# Patient Record
Sex: MALE | Race: WHITE | NOT HISPANIC OR LATINO | URBAN - METROPOLITAN AREA
[De-identification: names, ages, dates, MRNs, and addresses within clinical notes are randomized per-mention and may not be internally consistent; named-entity substitution may affect disease eponyms.]

---

## 2018-03-09 ENCOUNTER — OUTPATIENT (OUTPATIENT)
Dept: OUTPATIENT SERVICES | Facility: HOSPITAL | Age: 33
LOS: 1 days | End: 2018-03-09
Payer: MEDICAID

## 2018-03-09 ENCOUNTER — APPOINTMENT (OUTPATIENT)
Dept: SLEEP CENTER | Facility: CLINIC | Age: 33
End: 2018-03-09
Payer: MEDICAID

## 2018-03-09 PROCEDURE — 95810 POLYSOM 6/> YRS 4/> PARAM: CPT

## 2018-03-09 PROCEDURE — 95810 POLYSOM 6/> YRS 4/> PARAM: CPT | Mod: 26

## 2018-03-13 DIAGNOSIS — G47.33 OBSTRUCTIVE SLEEP APNEA (ADULT) (PEDIATRIC): ICD-10-CM

## 2018-05-31 ENCOUNTER — APPOINTMENT (OUTPATIENT)
Dept: FAMILY MEDICINE | Facility: CLINIC | Age: 33
End: 2018-05-31
Payer: MEDICAID

## 2018-05-31 VITALS
DIASTOLIC BLOOD PRESSURE: 80 MMHG | OXYGEN SATURATION: 91 % | WEIGHT: 187 LBS | TEMPERATURE: 100.1 F | SYSTOLIC BLOOD PRESSURE: 120 MMHG | BODY MASS INDEX: 33.13 KG/M2 | HEIGHT: 63 IN | HEART RATE: 125 BPM

## 2018-05-31 PROCEDURE — 99203 OFFICE O/P NEW LOW 30 MIN: CPT

## 2018-06-01 ENCOUNTER — TRANSCRIPTION ENCOUNTER (OUTPATIENT)
Age: 33
End: 2018-06-01

## 2018-06-12 ENCOUNTER — TRANSCRIPTION ENCOUNTER (OUTPATIENT)
Age: 33
End: 2018-06-12

## 2018-07-30 ENCOUNTER — APPOINTMENT (OUTPATIENT)
Dept: FAMILY MEDICINE | Facility: CLINIC | Age: 33
End: 2018-07-30
Payer: COMMERCIAL

## 2018-07-30 VITALS
OXYGEN SATURATION: 97 % | DIASTOLIC BLOOD PRESSURE: 80 MMHG | TEMPERATURE: 99.1 F | BODY MASS INDEX: 33.13 KG/M2 | WEIGHT: 187 LBS | HEART RATE: 108 BPM | HEIGHT: 63 IN | SYSTOLIC BLOOD PRESSURE: 122 MMHG

## 2018-07-30 PROCEDURE — 99213 OFFICE O/P EST LOW 20 MIN: CPT

## 2018-07-30 RX ORDER — DOXYCYCLINE HYCLATE 100 MG/1
100 TABLET ORAL
Qty: 20 | Refills: 0 | Status: DISCONTINUED | COMMUNITY
Start: 2018-03-29

## 2018-07-30 RX ORDER — PREDNISONE 20 MG/1
20 TABLET ORAL
Qty: 18 | Refills: 0 | Status: DISCONTINUED | COMMUNITY
Start: 2018-05-31 | End: 2018-07-30

## 2018-08-17 ENCOUNTER — APPOINTMENT (OUTPATIENT)
Dept: FAMILY MEDICINE | Facility: CLINIC | Age: 33
End: 2018-08-17
Payer: COMMERCIAL

## 2018-08-17 VITALS
WEIGHT: 188 LBS | RESPIRATION RATE: 20 BRPM | TEMPERATURE: 99.3 F | OXYGEN SATURATION: 98 % | DIASTOLIC BLOOD PRESSURE: 58 MMHG | BODY MASS INDEX: 33.31 KG/M2 | SYSTOLIC BLOOD PRESSURE: 110 MMHG | HEART RATE: 118 BPM | HEIGHT: 63 IN

## 2018-08-17 PROCEDURE — 99213 OFFICE O/P EST LOW 20 MIN: CPT

## 2018-08-17 NOTE — HISTORY OF PRESENT ILLNESS
[FreeTextEntry8] : 33 year old male here with complaints of waking up with fever and body aches and sore throat.  took advil and still feels not right.  Patients active medications, allergies and issues were all reviewed with the patient at time of visit.  patient also suffers from severe anxiety - sees \par

## 2018-08-17 NOTE — ASSESSMENT
[FreeTextEntry1] : Patient was advised to take all medications as prescribed and to finish any antibiotics in their entirety. Patient was told to rest, hydrate and treat symptoms as necessary. Patient was advised to return to this office or go directly to the ER if symptoms do not improve or if any worsening occurs.\par

## 2018-08-17 NOTE — PHYSICAL EXAM
[Well Nourished] : well nourished [Clear to Auscultation] : lungs were clear to auscultation bilaterally [Regular Rhythm] : with a regular rhythm [de-identified] : red tonsils with exudate bilaterally

## 2018-08-17 NOTE — HEALTH RISK ASSESSMENT
[] : No [No falls in past year] : Patient reported no falls in the past year [0] : 2) Feeling down, depressed, or hopeless: Not at all (0) [QJE4Kunmm] : 0

## 2018-08-27 ENCOUNTER — APPOINTMENT (OUTPATIENT)
Dept: FAMILY MEDICINE | Facility: CLINIC | Age: 33
End: 2018-08-27
Payer: MEDICAID

## 2018-08-27 VITALS
HEIGHT: 63 IN | WEIGHT: 192 LBS | TEMPERATURE: 98.3 F | OXYGEN SATURATION: 98 % | HEART RATE: 100 BPM | SYSTOLIC BLOOD PRESSURE: 120 MMHG | BODY MASS INDEX: 34.02 KG/M2 | DIASTOLIC BLOOD PRESSURE: 86 MMHG

## 2018-08-27 DIAGNOSIS — Z81.8 FAMILY HISTORY OF OTHER MENTAL AND BEHAVIORAL DISORDERS: ICD-10-CM

## 2018-08-27 PROCEDURE — 99214 OFFICE O/P EST MOD 30 MIN: CPT

## 2018-08-27 RX ORDER — PREDNISONE 20 MG/1
20 TABLET ORAL
Qty: 18 | Refills: 0 | Status: DISCONTINUED | COMMUNITY
Start: 2018-07-30 | End: 2018-08-27

## 2018-08-27 NOTE — PHYSICAL EXAM
[Well Nourished] : well nourished [Clear to Auscultation] : lungs were clear to auscultation bilaterally [Regular Rhythm] : with a regular rhythm [de-identified] : sad

## 2018-08-27 NOTE — HISTORY OF PRESENT ILLNESS
[FreeTextEntry8] : 33 year old male here with complaints of being stressed and sad.  sleeping a lot and is not happy. Patients active medications, allergies and issues were all reviewed with the patient at time of visit.\par

## 2018-08-27 NOTE — HEALTH RISK ASSESSMENT
[] : No [No falls in past year] : Patient reported no falls in the past year [3] : 2) Feeling down, depressed, or hopeless for nearly every day (3) [SJU4Mcxno] : 6 [JWY9Fhqcb] : 22

## 2018-08-27 NOTE — ASSESSMENT
[FreeTextEntry1] : Discussed diagnosis of anxiety and depression with the patient and potential outcomes/side affects of treatment versus non treatment. Medications were assessed and described at length. Side affects and black box warning were discussed.  Patient was advised to continue will all medications prescribed and the need for compliance was discussed and emphasized. Patient was advised to not stop medications without discussing with a health care provider first. Patient was advised to continue psychotherapy or seek therapy if not currently attending. Patient was educated on addictive potential of controlled substances and was counseled to use only as needed and sparingly. Patient verbalized understanding of all the above.\par patient with strong family history\par prozac did not work in the past due side affects\par will trial of lexapro

## 2018-09-26 ENCOUNTER — APPOINTMENT (OUTPATIENT)
Dept: FAMILY MEDICINE | Facility: CLINIC | Age: 33
End: 2018-09-26

## 2018-10-17 ENCOUNTER — APPOINTMENT (OUTPATIENT)
Dept: FAMILY MEDICINE | Facility: CLINIC | Age: 33
End: 2018-10-17
Payer: MEDICAID

## 2018-10-17 ENCOUNTER — APPOINTMENT (OUTPATIENT)
Dept: FAMILY MEDICINE | Facility: CLINIC | Age: 33
End: 2018-10-17

## 2018-10-17 VITALS
OXYGEN SATURATION: 98 % | TEMPERATURE: 98.2 F | SYSTOLIC BLOOD PRESSURE: 120 MMHG | RESPIRATION RATE: 16 BRPM | DIASTOLIC BLOOD PRESSURE: 80 MMHG | HEART RATE: 92 BPM

## 2018-10-17 DIAGNOSIS — J32.9 CHRONIC SINUSITIS, UNSPECIFIED: ICD-10-CM

## 2018-10-17 PROCEDURE — 99213 OFFICE O/P EST LOW 20 MIN: CPT

## 2018-10-17 RX ORDER — CEPHALEXIN 500 MG/1
500 CAPSULE ORAL
Qty: 20 | Refills: 0 | Status: DISCONTINUED | COMMUNITY
Start: 2018-08-17 | End: 2018-10-17

## 2018-10-17 RX ORDER — DOXYCYCLINE HYCLATE 100 MG/1
100 CAPSULE ORAL
Refills: 0 | Status: DISCONTINUED | COMMUNITY
Start: 2018-10-17 | End: 2018-10-17

## 2018-10-17 RX ORDER — IBUPROFEN 600 MG/1
600 TABLET, FILM COATED ORAL 3 TIMES DAILY
Qty: 21 | Refills: 0 | Status: DISCONTINUED | COMMUNITY
Start: 2018-08-17 | End: 2018-10-17

## 2018-10-17 RX ORDER — ESCITALOPRAM OXALATE 10 MG/1
10 TABLET ORAL
Qty: 30 | Refills: 0 | Status: DISCONTINUED | COMMUNITY
Start: 2018-08-27 | End: 2018-10-17

## 2018-10-17 NOTE — REVIEW OF SYSTEMS
[Sore Throat] : sore throat [Cough] : cough [Abdominal Pain] : no abdominal pain [Nausea] : no nausea [Diarrhea] : diarrhea [Vomiting] : vomiting [Negative] : Neurological [FreeTextEntry4] : Sinus congestion

## 2018-10-17 NOTE — PHYSICAL EXAM
[No Acute Distress] : no acute distress [Well Nourished] : well nourished [Well Developed] : well developed [Well-Appearing] : well-appearing [Normal Sclera/Conjunctiva] : normal sclera/conjunctiva [Normal Outer Ear/Nose] : the outer ears and nose were normal in appearance [Normal TMs] : both tympanic membranes were normal [Supple] : supple [No Lymphadenopathy] : no lymphadenopathy [No Respiratory Distress] : no respiratory distress  [Clear to Auscultation] : lungs were clear to auscultation bilaterally [No Accessory Muscle Use] : no accessory muscle use [Normal Rate] : normal rate  [Regular Rhythm] : with a regular rhythm [Normal S1, S2] : normal S1 and S2 [No Edema] : there was no peripheral edema [No Extremity Clubbing/Cyanosis] : no extremity clubbing/cyanosis [Soft] : abdomen soft [Non Tender] : non-tender [Non-distended] : non-distended [No Masses] : no abdominal mass palpated [Normal Bowel Sounds] : normal bowel sounds [Normal Affect] : the affect was normal [Normal Mood] : the mood was normal [de-identified] : mild erythema, no exudates.  Frontal sinus tenderness to palpation.

## 2018-10-17 NOTE — HISTORY OF PRESENT ILLNESS
[Congestion] : congestion [___ Days ago] : [unfilled] days ago [Cough] : cough [FreeTextEntry8] : 7 days of cough symptoms.  Was seen at urgent care yesterday and started on Doxycycline and Tessalon.  Tolerated one day of Doxy-had 2 episodes of vomiting today after taking the medication.  Also notes he feels bloated.\par \par Was taking advil.  Took 1 day of Zyrtec and 1 day of Claritin last week.\par Sick contacts at work. \par Denies sick contacts at home. Also needs refill on his inhaler. \par \par Denies fever.  \par \par Follows with Psych monthly-started on Seroquel and Zoloft 6 weeks ago.

## 2018-12-12 ENCOUNTER — APPOINTMENT (OUTPATIENT)
Dept: FAMILY MEDICINE | Facility: CLINIC | Age: 33
End: 2018-12-12
Payer: COMMERCIAL

## 2018-12-12 VITALS
HEART RATE: 92 BPM | OXYGEN SATURATION: 98 % | WEIGHT: 197 LBS | SYSTOLIC BLOOD PRESSURE: 108 MMHG | HEIGHT: 63 IN | BODY MASS INDEX: 34.91 KG/M2 | RESPIRATION RATE: 18 BRPM | DIASTOLIC BLOOD PRESSURE: 64 MMHG

## 2018-12-12 LAB
FLUAV SPEC QL CULT: NEGATIVE
FLUBV AG SPEC QL IA: NEGATIVE
S PYO AG SPEC QL IA: NEGATIVE

## 2018-12-12 PROCEDURE — 87880 STREP A ASSAY W/OPTIC: CPT | Mod: QW

## 2018-12-12 PROCEDURE — 99213 OFFICE O/P EST LOW 20 MIN: CPT | Mod: 25

## 2018-12-12 PROCEDURE — 87804 INFLUENZA ASSAY W/OPTIC: CPT | Mod: QW

## 2018-12-12 RX ORDER — AZITHROMYCIN 250 MG/1
250 TABLET, FILM COATED ORAL
Qty: 1 | Refills: 0 | Status: DISCONTINUED | COMMUNITY
Start: 2018-10-17 | End: 2018-12-12

## 2018-12-12 RX ORDER — BENZONATATE 200 MG/1
200 CAPSULE ORAL 3 TIMES DAILY
Refills: 0 | Status: DISCONTINUED | COMMUNITY
Start: 2018-10-17 | End: 2018-12-12

## 2018-12-12 NOTE — HEALTH RISK ASSESSMENT
[] : No [No falls in past year] : Patient reported no falls in the past year [3] : 2) Feeling down, depressed, or hopeless for nearly every day (3) [[id number]] : 6 [QRJ4Wmymr] : 22

## 2018-12-12 NOTE — ASSESSMENT
[FreeTextEntry1] : Discussed diagnosis of anxiety and depression with the patient and potential outcomes/side affects of treatment versus non treatment. Medications were assessed and described at length. Side affects and black box warning were discussed.  Patient was advised to continue will all medications prescribed and the need for compliance was discussed and emphasized. Patient was advised to not stop medications without discussing with a health care provider first. Patient was advised to continue psychotherapy or seek therapy if not currently attending. Patient was educated on addictive potential of controlled substances and was counseled to use only as needed and sparingly. Patient verbalized understanding of all the above.\par patient with strong family history\par prozac did not work in the past due side affects\par saw psychiatrist - was placed on zoloft and is feeling better\par feeling less up and down less depressed\par seeing psychiatrist monthly, going next in january\par \par Rapid flu test was administered for both Flu A and Flu B due to symptoms consistent with the flu.  Patient symptoms are due to a virus and is advised to rest, stay home, hydrate, avoid public places, use good handwashing hygiene.   May use tylenol/ibuprofen as necessary for symptoms.  If symptoms worsen or do not improve return to this office, seek care or go directly to the ER.\par Test results were: flu A and flu B negative\par \par A rapid strep test was done in office due to symptoms consistent with possible pharyngeal infection. Patients results were [negative ] for strep.\par \par The symptoms that are occurring are most likely secondary to virus, therefore antibiotics are deferred at this time. Patient was told to rest, hydrate and treat symptoms as necessary. May use tylenol/ibuprofen as necessary for symptomatic relief.  If symptoms worsen or do not improve return to this office, seek care or go directly to the ER.\par \par

## 2018-12-12 NOTE — PHYSICAL EXAM
[Well Nourished] : well nourished [Clear to Auscultation] : lungs were clear to auscultation bilaterally [Regular Rhythm] : with a regular rhythm [de-identified] : sad

## 2018-12-12 NOTE — HISTORY OF PRESENT ILLNESS
[FreeTextEntry8] : 33 year old male here with complaints of body aches, sore throat and fever.  feels "crappy"\par Patient never took the lexapro, and was started on zoloft by psychiatrist\par feels better overall\par Patients active medications, allergies and issues were all reviewed with the patient at time of visit.\par

## 2018-12-26 ENCOUNTER — APPOINTMENT (OUTPATIENT)
Dept: FAMILY MEDICINE | Facility: CLINIC | Age: 33
End: 2018-12-26
Payer: COMMERCIAL

## 2018-12-26 VITALS
BODY MASS INDEX: 35.08 KG/M2 | OXYGEN SATURATION: 97 % | SYSTOLIC BLOOD PRESSURE: 134 MMHG | DIASTOLIC BLOOD PRESSURE: 70 MMHG | HEART RATE: 106 BPM | HEIGHT: 63 IN | RESPIRATION RATE: 18 BRPM | WEIGHT: 198 LBS | TEMPERATURE: 99 F

## 2018-12-26 DIAGNOSIS — Z87.09 PERSONAL HISTORY OF OTHER DISEASES OF THE RESPIRATORY SYSTEM: ICD-10-CM

## 2018-12-26 PROCEDURE — 99213 OFFICE O/P EST LOW 20 MIN: CPT

## 2018-12-26 NOTE — HEALTH RISK ASSESSMENT
[] : No [No falls in past year] : Patient reported no falls in the past year [3] : 2) Feeling down, depressed, or hopeless for nearly every day (3) [JWF3Ftwut] : 6 [FQL8Qevfh] : 22

## 2018-12-26 NOTE — HISTORY OF PRESENT ILLNESS
[FreeTextEntry8] : 33 year old male here with complaints of body aches, sore throat and fever.  feels "crappy"\par was given prednisone, which helped but not has more stuffiness in his head and ear pain\par Patients active medications, allergies and issues were all reviewed with the patient at time of visit.\par

## 2018-12-26 NOTE — ASSESSMENT
[FreeTextEntry1] : Patient was advised to take all medications as prescribed and to finish any antibiotics in their entirety. Patient was told to rest, hydrate and treat symptoms as necessary. Patient was advised to return to this office or go directly to the ER if symptoms do not improve or if any worsening occurs.\par \par \par

## 2018-12-26 NOTE — PHYSICAL EXAM
[Well Nourished] : well nourished [Clear to Auscultation] : lungs were clear to auscultation bilaterally [Regular Rhythm] : with a regular rhythm [de-identified] : right ear - exudate [de-identified] : sad

## 2019-02-08 ENCOUNTER — LABORATORY RESULT (OUTPATIENT)
Age: 34
End: 2019-02-08

## 2019-02-08 ENCOUNTER — APPOINTMENT (OUTPATIENT)
Dept: FAMILY MEDICINE | Facility: CLINIC | Age: 34
End: 2019-02-08
Payer: COMMERCIAL

## 2019-02-08 VITALS
WEIGHT: 202 LBS | HEIGHT: 63 IN | BODY MASS INDEX: 35.79 KG/M2 | SYSTOLIC BLOOD PRESSURE: 135 MMHG | DIASTOLIC BLOOD PRESSURE: 60 MMHG | HEART RATE: 98 BPM | RESPIRATION RATE: 16 BRPM | OXYGEN SATURATION: 97 %

## 2019-02-08 DIAGNOSIS — B34.9 VIRAL INFECTION, UNSPECIFIED: ICD-10-CM

## 2019-02-08 DIAGNOSIS — Z11.3 ENCOUNTER FOR SCREENING FOR INFECTIONS WITH A PREDOMINANTLY SEXUAL MODE OF TRANSMISSION: ICD-10-CM

## 2019-02-08 PROCEDURE — 36415 COLL VENOUS BLD VENIPUNCTURE: CPT

## 2019-02-08 PROCEDURE — 99395 PREV VISIT EST AGE 18-39: CPT | Mod: 25

## 2019-02-08 RX ORDER — PREDNISONE 10 MG/1
10 TABLET ORAL
Qty: 8 | Refills: 0 | Status: DISCONTINUED | COMMUNITY
Start: 2018-12-12 | End: 2019-02-08

## 2019-02-08 RX ORDER — AZITHROMYCIN 250 MG/1
250 TABLET, FILM COATED ORAL
Qty: 1 | Refills: 0 | Status: DISCONTINUED | COMMUNITY
Start: 2018-12-26 | End: 2019-02-08

## 2019-02-08 NOTE — HEALTH RISK ASSESSMENT
[0] : 2) Feeling down, depressed, or hopeless: Not at all (0) [HIV Test offered] : HIV Test offered [Employed] : employed [Sexually Active] : sexually active [Smoke Detector] : smoke detector [Carbon Monoxide Detector] : carbon monoxide detector [Seat Belt] :  uses seat belt [Sunscreen] : uses sunscreen [] : No [de-identified] : Psych-every 2 months, SW every week; Neurologist-Dr. Weston appt 04/12/19 [de-identified] : very rarely few times a year [[id number]] : 0 [With Family] : lives with family [# of Members in Household ___] :  household currently consist of [unfilled] member(s) [College] : College [Reports changes in hearing] : Reports no changes in hearing [Reports changes in vision] : Reports no changes in vision [Reports changes in dental health] : Reports no changes in dental health [FreeTextEntry2] :  [de-identified] : making upcoming dental appointment

## 2019-02-08 NOTE — PLAN
[FreeTextEntry1] : Will follow-up labwork. \par \par Interested in PrEP, hx of previous positive STD testing-unclear which, knows it wasn't HIV. Recommended referral to ID for evaluation; patient will consider.  Patient concerned if it would be an appropriate medication with his other regular medications.   \par Discussed STD testing, agreeable to testing for HIV, GC, Syphilis and HSV 1, 2. \par \par Will check titers, patient will get records regarding Quant Gold and Tetanus vaccination.  Quant Gold in April 2018.\par Mr. Gleason agreeable to plan.

## 2019-02-08 NOTE — HISTORY OF PRESENT ILLNESS
[FreeTextEntry1] : Here for annual exam  [de-identified] : Here for annual physical. \par \par Brings immunization records.  Follows with Psych-doing well on Zoloft. \par \par On feet at work; walks for exercise.  Occasionally goes to gym.  \par Does not eat beef, pork. Mostly water, some soda. \par \par Averaging 6-8 hours of sleep.

## 2019-02-08 NOTE — REVIEW OF SYSTEMS
[Cough] : cough [Negative] : Neurological [Shortness Of Breath] : no shortness of breath [Wheezing] : no wheezing

## 2019-02-08 NOTE — HEALTH RISK ASSESSMENT
[0] : 2) Feeling down, depressed, or hopeless: Not at all (0) [With Family] : lives with family [# of Members in Household ___] :  household currently consist of [unfilled] member(s) [Employed] : employed [College] : College [Smoke Detector] : smoke detector [Carbon Monoxide Detector] : carbon monoxide detector [Seat Belt] :  uses seat belt [Sunscreen] : uses sunscreen [] : No [de-identified] : Psychiatry every 2 months- every week; Neurologist-Dr. Weston 4/12/19 Epilepsy [de-identified] : social few times a year [[id number]] : 0 [Reports changes in hearing] : Reports no changes in hearing [Reports changes in vision] : Reports no changes in vision [FreeTextEntry2] :  [de-identified] : last vision check last year [de-identified] : making upcoming dental appointment

## 2019-02-08 NOTE — PHYSICAL EXAM
[No Acute Distress] : no acute distress [Well Nourished] : well nourished [Well Developed] : well developed [Well-Appearing] : well-appearing [Normal Sclera/Conjunctiva] : normal sclera/conjunctiva [PERRL] : pupils equal round and reactive to light [EOMI] : extraocular movements intact [Normal Oropharynx] : the oropharynx was normal [Normal TMs] : both tympanic membranes were normal [Supple] : supple [No Lymphadenopathy] : no lymphadenopathy [Thyroid Normal, No Nodules] : the thyroid was normal and there were no nodules present [No Respiratory Distress] : no respiratory distress  [Clear to Auscultation] : lungs were clear to auscultation bilaterally [No Accessory Muscle Use] : no accessory muscle use [Normal Rate] : normal rate  [Regular Rhythm] : with a regular rhythm [Normal S1, S2] : normal S1 and S2 [No Edema] : there was no peripheral edema [No Extremity Clubbing/Cyanosis] : no extremity clubbing/cyanosis [Soft] : abdomen soft [Non Tender] : non-tender [Non-distended] : non-distended [No Masses] : no abdominal mass palpated [Normal Bowel Sounds] : normal bowel sounds [Grossly Normal Strength/Tone] : grossly normal strength/tone [Normal Affect] : the affect was normal [Alert and Oriented x3] : oriented to person, place, and time [Normal Mood] : the mood was normal

## 2019-02-08 NOTE — HISTORY OF PRESENT ILLNESS
[FreeTextEntry1] : Here for annual physical [de-identified] : Here for annual physical. \par \par Needs form for work and titers checked. \par Brings immunization records.  Follows with Psych-doing well on Zoloft. \par \par On feet at work; walks for exercise.  Occasionally goes to gym.  \par Does not eat beef, pork. Mostly water, some soda. \par \par Averaging 6-8 hours of sleep.  \par

## 2019-02-11 ENCOUNTER — TRANSCRIPTION ENCOUNTER (OUTPATIENT)
Age: 34
End: 2019-02-11

## 2019-02-11 ENCOUNTER — OTHER (OUTPATIENT)
Age: 34
End: 2019-02-11

## 2019-02-11 LAB
25(OH)D3 SERPL-MCNC: 18.5 NG/ML
ALBUMIN SERPL ELPH-MCNC: 5 G/DL
ALP BLD-CCNC: 110 U/L
ALT SERPL-CCNC: 33 U/L
ANION GAP SERPL CALC-SCNC: 12 MMOL/L
APPEARANCE: ABNORMAL
AST SERPL-CCNC: 9 U/L
BASOPHILS # BLD AUTO: 0.05 K/UL
BASOPHILS NFR BLD AUTO: 0.6 %
BILIRUB SERPL-MCNC: 0.3 MG/DL
BILIRUBIN URINE: NEGATIVE
BLOOD URINE: NEGATIVE
BUN SERPL-MCNC: 19 MG/DL
C TRACH RRNA SPEC QL NAA+PROBE: NOT DETECTED
CALCIUM SERPL-MCNC: 9.3 MG/DL
CHLORIDE SERPL-SCNC: 111 MMOL/L
CHOLEST SERPL-MCNC: 167 MG/DL
CHOLEST/HDLC SERPL: 3.9 RATIO
CO2 SERPL-SCNC: 22 MMOL/L
COLOR: YELLOW
CREAT SERPL-MCNC: 0.94 MG/DL
EOSINOPHIL # BLD AUTO: 0.19 K/UL
EOSINOPHIL NFR BLD AUTO: 2.2 %
GLUCOSE QUALITATIVE U: NEGATIVE MG/DL
GLUCOSE SERPL-MCNC: 91 MG/DL
HBA1C MFR BLD HPLC: 5.2 %
HBV SURFACE AB SER QL: REACTIVE
HCT VFR BLD CALC: 47.5 %
HDLC SERPL-MCNC: 43 MG/DL
HGB BLD-MCNC: 15.5 G/DL
HIV1+2 AB SPEC QL IA.RAPID: NONREACTIVE
HSV 1+2 IGG SER IA-IMP: NEGATIVE
HSV 1+2 IGG SER IA-IMP: POSITIVE
HSV1 IGG SER QL: 29.2 INDEX
HSV2 IGG SER QL: 0.1 INDEX
IMM GRANULOCYTES NFR BLD AUTO: 0.2 %
KETONES URINE: NEGATIVE
LDLC SERPL CALC-MCNC: 109 MG/DL
LEUKOCYTE ESTERASE URINE: NEGATIVE
LYMPHOCYTES # BLD AUTO: 2.32 K/UL
LYMPHOCYTES NFR BLD AUTO: 26.9 %
MAN DIFF?: NORMAL
MCHC RBC-ENTMCNC: 28.5 PG
MCHC RBC-ENTMCNC: 32.6 GM/DL
MCV RBC AUTO: 87.3 FL
MEV IGG FLD QL IA: 229 AU/ML
MEV IGG+IGM SER-IMP: POSITIVE
MONOCYTES # BLD AUTO: 0.41 K/UL
MONOCYTES NFR BLD AUTO: 4.7 %
MUV AB SER-ACNC: POSITIVE
MUV IGG SER QL IA: 13.6 AU/ML
N GONORRHOEA RRNA SPEC QL NAA+PROBE: NOT DETECTED
NEUTROPHILS # BLD AUTO: 5.65 K/UL
NEUTROPHILS NFR BLD AUTO: 65.4 %
NITRITE URINE: NEGATIVE
PH URINE: 7.5
PLATELET # BLD AUTO: 234 K/UL
POTASSIUM SERPL-SCNC: 4.5 MMOL/L
PROT SERPL-MCNC: 7.1 G/DL
PROTEIN URINE: NEGATIVE MG/DL
RBC # BLD: 5.44 M/UL
RBC # FLD: 14.1 %
RUBV IGG FLD-ACNC: 0.8 INDEX
RUBV IGG SER-IMP: NEGATIVE
SODIUM SERPL-SCNC: 145 MMOL/L
SOURCE AMPLIFICATION: NORMAL
SPECIFIC GRAVITY URINE: 1.02
T PALLIDUM AB SER QL IA: NEGATIVE
TRIGL SERPL-MCNC: 77 MG/DL
TSH SERPL-ACNC: 0.8 UIU/ML
UROBILINOGEN URINE: NEGATIVE MG/DL
VZV AB TITR SER: POSITIVE
VZV IGG SER IF-ACNC: 1662 INDEX
WBC # FLD AUTO: 8.64 K/UL

## 2019-02-13 ENCOUNTER — TRANSCRIPTION ENCOUNTER (OUTPATIENT)
Age: 34
End: 2019-02-13

## 2019-02-14 ENCOUNTER — APPOINTMENT (OUTPATIENT)
Dept: FAMILY MEDICINE | Facility: CLINIC | Age: 34
End: 2019-02-14
Payer: COMMERCIAL

## 2019-02-14 VITALS
WEIGHT: 197 LBS | SYSTOLIC BLOOD PRESSURE: 130 MMHG | DIASTOLIC BLOOD PRESSURE: 60 MMHG | HEIGHT: 63 IN | HEART RATE: 100 BPM | BODY MASS INDEX: 34.91 KG/M2 | TEMPERATURE: 97.8 F

## 2019-02-14 DIAGNOSIS — K52.9 NONINFECTIVE GASTROENTERITIS AND COLITIS, UNSPECIFIED: ICD-10-CM

## 2019-02-14 PROCEDURE — 99213 OFFICE O/P EST LOW 20 MIN: CPT

## 2019-02-14 NOTE — PHYSICAL EXAM
[No Acute Distress] : no acute distress [Well Nourished] : well nourished [Well Developed] : well developed [Normal Sclera/Conjunctiva] : normal sclera/conjunctiva [Normal Oropharynx] : the oropharynx was normal [Normal TMs] : both tympanic membranes were normal [Supple] : supple [No Lymphadenopathy] : no lymphadenopathy [No Respiratory Distress] : no respiratory distress  [Clear to Auscultation] : lungs were clear to auscultation bilaterally [No Accessory Muscle Use] : no accessory muscle use [Normal Rate] : normal rate  [Regular Rhythm] : with a regular rhythm [Normal S1, S2] : normal S1 and S2 [No Edema] : there was no peripheral edema [No Extremity Clubbing/Cyanosis] : no extremity clubbing/cyanosis [Soft] : abdomen soft [Non Tender] : non-tender [Non-distended] : non-distended [No Masses] : no abdominal mass palpated [Normal Bowel Sounds] : normal bowel sounds [Normal Affect] : the affect was normal [Normal Mood] : the mood was normal

## 2019-02-14 NOTE — PLAN
[FreeTextEntry1] : Encouraged patient to drink plenty of fluids, rest, and take supportive care measures.  Advised plenty of hydration, small meals, plain foods- toast, crackers, bananas, apple sauce.  Advised avoiding greasy, acidic foods, avoiding carbonated beverages. \par \par Discussed with Mr. HIDALGO precautions and advised to go to Emergency Room if worsening abdominal pain, vomitting,unable to stay hydrated.  Mr. HIDALGO expressed understanding.\par \par \par Patient will reschedule appointment to have MMR vaccine.

## 2019-02-14 NOTE — HISTORY OF PRESENT ILLNESS
[Vomiting] : vomiting [___ Days ago] : [unfilled] days ago [Rest] : rest [OTC Remedies] : OTC remedies [Nausea] : no nausea [Diarrhea] : no diarrhea [FreeTextEntry5] : Advil [FreeTextEntry8] : Here for gastro symptoms.\par \par 5 episodes of vomitting and multiple episodes of diarrhea yesterday.  States symptoms have improved. No vomitting today, was able to tolerate ginger ale, italian ice and some chicken soup today.  No diarrhea today.  Denies blood in vomitus or in toilet bowl. Notes he has had a lot of gas. \par Improved from yesterday.  \par \par Denies fever/chills.  No other symptoms, no upper respiratory symptoms. 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 \par \par \par

## 2019-02-20 ENCOUNTER — APPOINTMENT (OUTPATIENT)
Dept: FAMILY MEDICINE | Facility: CLINIC | Age: 34
End: 2019-02-20
Payer: COMMERCIAL

## 2019-02-20 VITALS — TEMPERATURE: 98.5 F

## 2019-02-20 VITALS
WEIGHT: 197 LBS | SYSTOLIC BLOOD PRESSURE: 130 MMHG | DIASTOLIC BLOOD PRESSURE: 60 MMHG | OXYGEN SATURATION: 97 % | HEIGHT: 63 IN | BODY MASS INDEX: 34.91 KG/M2 | RESPIRATION RATE: 16 BRPM | HEART RATE: 88 BPM

## 2019-02-20 DIAGNOSIS — Z23 ENCOUNTER FOR IMMUNIZATION: ICD-10-CM

## 2019-02-20 PROCEDURE — 90707 MMR VACCINE SC: CPT

## 2019-02-20 PROCEDURE — 90471 IMMUNIZATION ADMIN: CPT

## 2019-02-20 PROCEDURE — 99213 OFFICE O/P EST LOW 20 MIN: CPT | Mod: 25

## 2019-02-20 NOTE — HISTORY OF PRESENT ILLNESS
[FreeTextEntry1] : Here for follow-up, needs MMR booster. Improved from gastroenteritis.  [de-identified] : Here for follow-up, requires MMR booster for work.  Feeling better from gastroenteritis.   Notes multiple co-workers were sick at work. \par Feeling improved. Tolerating food and drink without difficulty, BM back to normal.\par \par Notes feeling tired.  Will only get 6 hours of sleep per night. Trying to eat healthier and wants to lose some weight, but feels tired and finds it hard to go to the gym.  Trying decaf green tea to help with weight. \par Discussed sleep hygiene techniques, regulating sleep schedule.  \par \par Has no history of problems with vaccinations. \par \par \par

## 2019-02-20 NOTE — PLAN
[FreeTextEntry1] : Received MMR.  Advised Mr. LAZARO HIDALGO they may experience some soreness, tenderness at the injection site.  Advised to call office if  not improving.  Mr. HIDALGO expressed understanding.\par \par Sleep hygiene discussed. Handout given. Encouraged promoting better quality sleep, healthy eating, to help with symptoms of tiredness.  Follow up as needed.  Labs recently checked and within normal limits. \par \par Form filled out.

## 2019-03-02 ENCOUNTER — TRANSCRIPTION ENCOUNTER (OUTPATIENT)
Age: 34
End: 2019-03-02

## 2019-03-11 ENCOUNTER — TRANSCRIPTION ENCOUNTER (OUTPATIENT)
Age: 34
End: 2019-03-11

## 2019-04-11 ENCOUNTER — APPOINTMENT (OUTPATIENT)
Dept: FAMILY MEDICINE | Facility: CLINIC | Age: 34
End: 2019-04-11
Payer: SELF-PAY

## 2019-04-11 VITALS
HEIGHT: 63 IN | RESPIRATION RATE: 17 BRPM | OXYGEN SATURATION: 98 % | BODY MASS INDEX: 34.91 KG/M2 | SYSTOLIC BLOOD PRESSURE: 110 MMHG | HEART RATE: 87 BPM | WEIGHT: 197 LBS | DIASTOLIC BLOOD PRESSURE: 60 MMHG

## 2019-04-11 DIAGNOSIS — G47.9 SLEEP DISORDER, UNSPECIFIED: ICD-10-CM

## 2019-04-11 PROCEDURE — 86580 TB INTRADERMAL TEST: CPT

## 2019-04-11 PROCEDURE — 99213 OFFICE O/P EST LOW 20 MIN: CPT | Mod: 25

## 2019-04-11 NOTE — REVIEW OF SYSTEMS
[Headache] : headache [Dizziness] : no dizziness [Negative] : Gastrointestinal [FreeTextEntry4] : seasonal allergies [de-identified] : episodes of headaches

## 2019-04-11 NOTE — HISTORY OF PRESENT ILLNESS
[FreeTextEntry1] : Here for PPD for work clearance, also seasonal allergies. [de-identified] : Here for follow-up, needs PPD and seasonal allergies.\par \par Notes he has been having some headaches, thinks it is related to seasonal allergies.  Has started using Flonase some days.   Also with some muscle tension in back. \par Also with new work position, changing work schedule has been having some difficulty sleeping, waking up too early.  Sleep hygiene discussed.   \par \par No history of positive PPD.  \par

## 2019-04-11 NOTE — PLAN
[FreeTextEntry1] : \par PPD placed in  forearm.  Patient tolerated procedure well.  No history of positive PPD per patient.  \par Advised patient to return to office in 48-72 hours to have PPD read. Patient expressed understanding.\par \par Advised regular use of Flonase for seasonal allergies. \par Sleep hygiene discussed, possibly contributing to headaches, possible tension headaches.  Stable exam.  Encouraged proper hydration. \par \par Advised to try trial of heat/warm compresses for muscle tenderness as needed.  Advised to cover compress, not place directly on skin and not to apply for more than 15 minutes at a time.  Patient expressed understanding.\par

## 2019-04-11 NOTE — PHYSICAL EXAM
[No Acute Distress] : no acute distress [Well Nourished] : well nourished [Well Developed] : well developed [Well-Appearing] : well-appearing [Normal Sclera/Conjunctiva] : normal sclera/conjunctiva [PERRL] : pupils equal round and reactive to light [EOMI] : extraocular movements intact [Normal Outer Ear/Nose] : the outer ears and nose were normal in appearance [Normal Oropharynx] : the oropharynx was normal [Normal TMs] : both tympanic membranes were normal [Supple] : supple [No Respiratory Distress] : no respiratory distress  [Clear to Auscultation] : lungs were clear to auscultation bilaterally [No Accessory Muscle Use] : no accessory muscle use [Normal Rate] : normal rate  [Regular Rhythm] : with a regular rhythm [Normal S1, S2] : normal S1 and S2 [Normal Affect] : the affect was normal [Normal Mood] : the mood was normal [Alert and Oriented x3] : oriented to person, place, and time [de-identified] : No paraspinal tenderness [de-identified] : CN II-XII intact

## 2019-06-20 ENCOUNTER — TRANSCRIPTION ENCOUNTER (OUTPATIENT)
Age: 34
End: 2019-06-20

## 2020-01-15 ENCOUNTER — APPOINTMENT (OUTPATIENT)
Dept: FAMILY MEDICINE | Facility: CLINIC | Age: 35
End: 2020-01-15
Payer: COMMERCIAL

## 2020-01-15 VITALS
TEMPERATURE: 97.9 F | DIASTOLIC BLOOD PRESSURE: 76 MMHG | HEIGHT: 63 IN | SYSTOLIC BLOOD PRESSURE: 118 MMHG | HEART RATE: 100 BPM | OXYGEN SATURATION: 95 %

## 2020-01-15 DIAGNOSIS — J06.9 ACUTE UPPER RESPIRATORY INFECTION, UNSPECIFIED: ICD-10-CM

## 2020-01-15 PROCEDURE — 99213 OFFICE O/P EST LOW 20 MIN: CPT

## 2020-01-15 NOTE — HEALTH RISK ASSESSMENT
[] : No [No falls in past year] : Patient reported no falls in the past year [3] : 2) Feeling down, depressed, or hopeless for nearly every day (3) [OOJ7Pbwfb] : 6 [SGN0Jxpoh] : 22

## 2020-01-15 NOTE — ASSESSMENT
[FreeTextEntry1] : uri\par The symptoms that are occurring are most likely secondary to virus, therefore antibiotics are deferred at this time. Patient was told to rest, hydrate and treat symptoms as necessary. May use tylenol/ibuprofen as necessary for symptomatic relief.  If symptoms worsen or do not improve return to this office, seek care or go directly to the ER.\par \par

## 2020-01-15 NOTE — PHYSICAL EXAM
[Well Nourished] : well nourished [Regular Rhythm] : with a regular rhythm [Clear to Auscultation] : lungs were clear to auscultation bilaterally [de-identified] : sad

## 2020-01-15 NOTE — HISTORY OF PRESENT ILLNESS
[FreeTextEntry8] : 34 year old male here with complaints of runny nose, cough, congestion for a week. Patients active medications, allergies and issues were all reviewed with the patient at time of visit.\par

## 2020-01-22 ENCOUNTER — APPOINTMENT (OUTPATIENT)
Dept: FAMILY MEDICINE | Facility: CLINIC | Age: 35
End: 2020-01-22
Payer: COMMERCIAL

## 2020-01-22 VITALS
DIASTOLIC BLOOD PRESSURE: 80 MMHG | TEMPERATURE: 99.4 F | SYSTOLIC BLOOD PRESSURE: 126 MMHG | OXYGEN SATURATION: 98 % | HEART RATE: 118 BPM

## 2020-01-22 DIAGNOSIS — Z87.09 PERSONAL HISTORY OF OTHER DISEASES OF THE RESPIRATORY SYSTEM: ICD-10-CM

## 2020-01-22 LAB
FLUAV SPEC QL CULT: POSITIVE
FLUBV AG SPEC QL IA: NEGATIVE

## 2020-01-22 PROCEDURE — 87804 INFLUENZA ASSAY W/OPTIC: CPT | Mod: QW

## 2020-01-22 PROCEDURE — 99213 OFFICE O/P EST LOW 20 MIN: CPT | Mod: 25

## 2020-01-22 NOTE — PHYSICAL EXAM
[Well Nourished] : well nourished [Clear to Auscultation] : lungs were clear to auscultation bilaterally [Regular Rhythm] : with a regular rhythm [de-identified] : sad

## 2020-01-22 NOTE — HEALTH RISK ASSESSMENT
[No falls in past year] : Patient reported no falls in the past year [3] : 2) Feeling down, depressed, or hopeless for nearly every day (3) [] : No [AXV2Tpuuo] : 6 [YSZ9Dmygu] : 22

## 2020-01-22 NOTE — ASSESSMENT
[FreeTextEntry1] : viral illness\par FU\par Rapid flu test was administered for both Flu A and Flu B due to symptoms consistent with the flu.  Patient symptoms are due to a virus and is advised to rest, stay home, hydrate, avoid public places, use good handwashing hygiene.   May use tylenol/ibuprofen as necessary for symptoms.  If symptoms worsen or do not improve return to this office, seek care or go directly to the ER.\par Test results were: flu A and flu B\par POSITIVE A\par \par \par

## 2020-02-17 ENCOUNTER — APPOINTMENT (OUTPATIENT)
Dept: FAMILY MEDICINE | Facility: CLINIC | Age: 35
End: 2020-02-17
Payer: COMMERCIAL

## 2020-02-17 VITALS
WEIGHT: 218.44 LBS | TEMPERATURE: 98.6 F | DIASTOLIC BLOOD PRESSURE: 60 MMHG | SYSTOLIC BLOOD PRESSURE: 136 MMHG | BODY MASS INDEX: 38.7 KG/M2 | HEART RATE: 107 BPM | OXYGEN SATURATION: 98 % | HEIGHT: 63 IN

## 2020-02-17 DIAGNOSIS — Z11.1 ENCOUNTER FOR SCREENING FOR RESPIRATORY TUBERCULOSIS: ICD-10-CM

## 2020-02-17 DIAGNOSIS — Z23 ENCOUNTER FOR IMMUNIZATION: ICD-10-CM

## 2020-02-17 PROCEDURE — G0008: CPT

## 2020-02-17 PROCEDURE — 36415 COLL VENOUS BLD VENIPUNCTURE: CPT

## 2020-02-17 PROCEDURE — 99395 PREV VISIT EST AGE 18-39: CPT | Mod: 25

## 2020-02-17 PROCEDURE — 90686 IIV4 VACC NO PRSV 0.5 ML IM: CPT

## 2020-02-17 PROCEDURE — 86580 TB INTRADERMAL TEST: CPT

## 2020-02-17 NOTE — PHYSICAL EXAM
[Regular Rhythm] : with a regular rhythm [Well Nourished] : well nourished [Clear to Auscultation] : lungs were clear to auscultation bilaterally [No Focal Deficits] : no focal deficits [Normal Affect] : the affect was normal [Coordination Grossly Intact] : coordination grossly intact [Normal Insight/Judgement] : insight and judgment were intact

## 2020-02-17 NOTE — HEALTH RISK ASSESSMENT
[] : No [Very Good] : ~his/her~  mood as very good [Yes] : Yes [No falls in past year] : Patient reported no falls in the past year [BNH6Ybsmj] : 22 [3] : 2) Feeling down, depressed, or hopeless for nearly every day (3) [TWY0Eplqa] : 6 [None] : None [Single] : single [With Family] : lives with family [Employed] : employed [Fully functional (using the telephone, shopping, preparing meals, housekeeping, doing laundry, using] : Fully functional and needs no help or supervision to perform IADLs (using the telephone, shopping, preparing meals, housekeeping, doing laundry, using transportation, managing medications and managing finances) [Fully functional (bathing, dressing, toileting, transferring, walking, feeding)] : Fully functional (bathing, dressing, toileting, transferring, walking, feeding) [de-identified] :

## 2020-02-17 NOTE — HISTORY OF PRESENT ILLNESS
[FreeTextEntry8] : 34 year old male  here for annual well visit. Patient's blood work was drawn and medications reviewed. Patient's past medical history was reviewed, allergies verified and problems were identified and assessed. Patients medications were reviewed. Patient is feeling well with no new or active complaints at this time.\par

## 2020-02-18 LAB
ALBUMIN SERPL ELPH-MCNC: 4.9 G/DL
ALP BLD-CCNC: 108 U/L
ALT SERPL-CCNC: 30 U/L
ANION GAP SERPL CALC-SCNC: 13 MMOL/L
APPEARANCE: CLEAR
AST SERPL-CCNC: 18 U/L
BASOPHILS # BLD AUTO: 0.07 K/UL
BASOPHILS NFR BLD AUTO: 0.7 %
BILIRUB SERPL-MCNC: 0.2 MG/DL
BILIRUBIN URINE: NEGATIVE
BLOOD URINE: NEGATIVE
BUN SERPL-MCNC: 14 MG/DL
CALCIUM SERPL-MCNC: 9.9 MG/DL
CHLORIDE SERPL-SCNC: 109 MMOL/L
CHOLEST SERPL-MCNC: 173 MG/DL
CHOLEST/HDLC SERPL: 4.4 RATIO
CO2 SERPL-SCNC: 22 MMOL/L
COLOR: NORMAL
CREAT SERPL-MCNC: 0.76 MG/DL
EOSINOPHIL # BLD AUTO: 0.24 K/UL
EOSINOPHIL NFR BLD AUTO: 2.4 %
ESTIMATED AVERAGE GLUCOSE: 120 MG/DL
GLUCOSE QUALITATIVE U: NEGATIVE
GLUCOSE SERPL-MCNC: 81 MG/DL
HBA1C MFR BLD HPLC: 5.8 %
HCT VFR BLD CALC: 47.6 %
HDLC SERPL-MCNC: 39 MG/DL
HGB BLD-MCNC: 15.6 G/DL
HIV1+2 AB SPEC QL IA.RAPID: NONREACTIVE
IMM GRANULOCYTES NFR BLD AUTO: 0.4 %
KETONES URINE: NEGATIVE
LDLC SERPL CALC-MCNC: 73 MG/DL
LEUKOCYTE ESTERASE URINE: NEGATIVE
LYMPHOCYTES # BLD AUTO: 3.65 K/UL
LYMPHOCYTES NFR BLD AUTO: 35.9 %
MAN DIFF?: NORMAL
MCHC RBC-ENTMCNC: 27.1 PG
MCHC RBC-ENTMCNC: 32.8 GM/DL
MCV RBC AUTO: 82.8 FL
MONOCYTES # BLD AUTO: 1.09 K/UL
MONOCYTES NFR BLD AUTO: 10.7 %
NEUTROPHILS # BLD AUTO: 5.07 K/UL
NEUTROPHILS NFR BLD AUTO: 49.9 %
NITRITE URINE: NEGATIVE
PH URINE: 7
PLATELET # BLD AUTO: 299 K/UL
POTASSIUM SERPL-SCNC: 3.9 MMOL/L
PROT SERPL-MCNC: 7.3 G/DL
PROTEIN URINE: NEGATIVE
RBC # BLD: 5.75 M/UL
RBC # FLD: 14.6 %
SODIUM SERPL-SCNC: 144 MMOL/L
SPECIFIC GRAVITY URINE: 1.02
TRIGL SERPL-MCNC: 307 MG/DL
TSH SERPL-ACNC: 1.12 UIU/ML
UROBILINOGEN URINE: NORMAL
WBC # FLD AUTO: 10.16 K/UL

## 2020-02-25 ENCOUNTER — TRANSCRIPTION ENCOUNTER (OUTPATIENT)
Age: 35
End: 2020-02-25

## 2020-03-02 ENCOUNTER — TRANSCRIPTION ENCOUNTER (OUTPATIENT)
Age: 35
End: 2020-03-02

## 2020-03-16 ENCOUNTER — TRANSCRIPTION ENCOUNTER (OUTPATIENT)
Age: 35
End: 2020-03-16

## 2020-03-23 ENCOUNTER — TRANSCRIPTION ENCOUNTER (OUTPATIENT)
Age: 35
End: 2020-03-23

## 2020-03-26 ENCOUNTER — TRANSCRIPTION ENCOUNTER (OUTPATIENT)
Age: 35
End: 2020-03-26

## 2020-05-26 ENCOUNTER — TRANSCRIPTION ENCOUNTER (OUTPATIENT)
Age: 35
End: 2020-05-26

## 2020-06-28 ENCOUNTER — TRANSCRIPTION ENCOUNTER (OUTPATIENT)
Age: 35
End: 2020-06-28

## 2020-07-06 ENCOUNTER — TRANSCRIPTION ENCOUNTER (OUTPATIENT)
Age: 35
End: 2020-07-06

## 2020-07-17 ENCOUNTER — TRANSCRIPTION ENCOUNTER (OUTPATIENT)
Age: 35
End: 2020-07-17

## 2020-07-19 ENCOUNTER — TRANSCRIPTION ENCOUNTER (OUTPATIENT)
Age: 35
End: 2020-07-19

## 2020-08-17 ENCOUNTER — TRANSCRIPTION ENCOUNTER (OUTPATIENT)
Age: 35
End: 2020-08-17

## 2020-08-30 ENCOUNTER — TRANSCRIPTION ENCOUNTER (OUTPATIENT)
Age: 35
End: 2020-08-30

## 2020-08-31 ENCOUNTER — TRANSCRIPTION ENCOUNTER (OUTPATIENT)
Age: 35
End: 2020-08-31

## 2020-09-01 ENCOUNTER — TRANSCRIPTION ENCOUNTER (OUTPATIENT)
Age: 35
End: 2020-09-01

## 2020-09-26 ENCOUNTER — TRANSCRIPTION ENCOUNTER (OUTPATIENT)
Age: 35
End: 2020-09-26

## 2020-10-19 ENCOUNTER — TRANSCRIPTION ENCOUNTER (OUTPATIENT)
Age: 35
End: 2020-10-19

## 2020-10-31 ENCOUNTER — TRANSCRIPTION ENCOUNTER (OUTPATIENT)
Age: 35
End: 2020-10-31

## 2020-11-09 ENCOUNTER — TRANSCRIPTION ENCOUNTER (OUTPATIENT)
Age: 35
End: 2020-11-09

## 2020-11-10 ENCOUNTER — TRANSCRIPTION ENCOUNTER (OUTPATIENT)
Age: 35
End: 2020-11-10

## 2020-11-13 ENCOUNTER — TRANSCRIPTION ENCOUNTER (OUTPATIENT)
Age: 35
End: 2020-11-13

## 2020-11-17 ENCOUNTER — APPOINTMENT (OUTPATIENT)
Dept: FAMILY MEDICINE | Facility: CLINIC | Age: 35
End: 2020-11-17
Payer: COMMERCIAL

## 2020-11-17 VITALS — BODY MASS INDEX: 32.77 KG/M2 | WEIGHT: 185 LBS

## 2020-11-17 PROCEDURE — 99072 ADDL SUPL MATRL&STAF TM PHE: CPT

## 2020-11-17 PROCEDURE — 99213 OFFICE O/P EST LOW 20 MIN: CPT

## 2020-11-17 NOTE — HEALTH RISK ASSESSMENT
[] : No [Yes] : Yes [No falls in past year] : Patient reported no falls in the past year [3] : 2) Feeling down, depressed, or hopeless for nearly every day (3) [BHC1Tstlw] : 6 [MAX3Qtjpq] : 22 [Very Good] : ~his/her~  mood as very good [None] : None [With Family] : lives with family [Employed] : employed [Single] : single [Fully functional (bathing, dressing, toileting, transferring, walking, feeding)] : Fully functional (bathing, dressing, toileting, transferring, walking, feeding) [Fully functional (using the telephone, shopping, preparing meals, housekeeping, doing laundry, using] : Fully functional and needs no help or supervision to perform IADLs (using the telephone, shopping, preparing meals, housekeeping, doing laundry, using transportation, managing medications and managing finances) [de-identified] :

## 2020-11-17 NOTE — PHYSICAL EXAM
[Well Nourished] : well nourished [Clear to Auscultation] : lungs were clear to auscultation bilaterally [Regular Rhythm] : with a regular rhythm [Coordination Grossly Intact] : coordination grossly intact [No Focal Deficits] : no focal deficits [Normal Affect] : the affect was normal [Normal Insight/Judgement] : insight and judgment were intact [de-identified] : tenderness to intercostal cartilage left side between ribs 3-6

## 2020-11-30 ENCOUNTER — TRANSCRIPTION ENCOUNTER (OUTPATIENT)
Age: 35
End: 2020-11-30

## 2020-12-15 ENCOUNTER — TRANSCRIPTION ENCOUNTER (OUTPATIENT)
Age: 35
End: 2020-12-15

## 2020-12-16 PROBLEM — Z87.09 HISTORY OF ACUTE PHARYNGITIS: Status: RESOLVED | Noted: 2018-08-17 | Resolved: 2020-12-16

## 2020-12-23 PROBLEM — Z87.09 HISTORY OF INFLUENZA: Status: RESOLVED | Noted: 2020-01-22 | Resolved: 2020-12-23

## 2020-12-23 PROBLEM — J06.9 ACUTE URI: Status: RESOLVED | Noted: 2020-01-15 | Resolved: 2020-12-23

## 2021-01-03 ENCOUNTER — TRANSCRIPTION ENCOUNTER (OUTPATIENT)
Age: 36
End: 2021-01-03

## 2021-01-11 ENCOUNTER — TRANSCRIPTION ENCOUNTER (OUTPATIENT)
Age: 36
End: 2021-01-11

## 2021-01-13 ENCOUNTER — TRANSCRIPTION ENCOUNTER (OUTPATIENT)
Age: 36
End: 2021-01-13

## 2021-02-04 ENCOUNTER — TRANSCRIPTION ENCOUNTER (OUTPATIENT)
Age: 36
End: 2021-02-04

## 2021-02-16 ENCOUNTER — APPOINTMENT (OUTPATIENT)
Dept: FAMILY MEDICINE | Facility: CLINIC | Age: 36
End: 2021-02-16
Payer: COMMERCIAL

## 2021-02-16 VITALS
WEIGHT: 182.25 LBS | SYSTOLIC BLOOD PRESSURE: 128 MMHG | HEIGHT: 63 IN | HEART RATE: 116 BPM | OXYGEN SATURATION: 94 % | DIASTOLIC BLOOD PRESSURE: 80 MMHG | BODY MASS INDEX: 32.29 KG/M2

## 2021-02-16 DIAGNOSIS — Z11.1 ENCOUNTER FOR SCREENING FOR RESPIRATORY TUBERCULOSIS: ICD-10-CM

## 2021-02-16 PROCEDURE — 99072 ADDL SUPL MATRL&STAF TM PHE: CPT

## 2021-02-16 PROCEDURE — 86580 TB INTRADERMAL TEST: CPT

## 2021-02-16 PROCEDURE — 99213 OFFICE O/P EST LOW 20 MIN: CPT | Mod: 25

## 2021-02-16 RX ORDER — QUETIAPINE 25 MG/1
25 TABLET, FILM COATED ORAL
Refills: 0 | Status: DISCONTINUED | COMMUNITY
Start: 2018-10-17 | End: 2021-02-16

## 2021-02-16 RX ORDER — OMEPRAZOLE 40 MG/1
40 CAPSULE, DELAYED RELEASE ORAL
Refills: 0 | Status: ACTIVE | COMMUNITY

## 2021-02-16 RX ORDER — OSELTAMIVIR PHOSPHATE 75 MG/1
75 CAPSULE ORAL TWICE DAILY
Qty: 10 | Refills: 0 | Status: DISCONTINUED | COMMUNITY
Start: 2020-01-22 | End: 2021-02-16

## 2021-02-16 RX ORDER — METHYLPREDNISOLONE 4 MG/1
4 TABLET ORAL
Qty: 1 | Refills: 0 | Status: DISCONTINUED | COMMUNITY
Start: 2020-01-15 | End: 2021-02-16

## 2021-02-16 RX ORDER — BROMPHENIRAMINE MALEATE, PSEUDOEPHEDRINE HYDROCHLORIDE AND DEXTROMETHORPHAN HYDROBROMIDE 2; 30; 10 MG/5ML; MG/5ML; MG/5ML
30-2-10 SYRUP ORAL
Qty: 200 | Refills: 0 | Status: DISCONTINUED | COMMUNITY
Start: 2020-01-15 | End: 2021-02-16

## 2021-02-16 RX ORDER — SERTRALINE HYDROCHLORIDE 100 MG/1
100 TABLET, FILM COATED ORAL
Refills: 0 | Status: DISCONTINUED | COMMUNITY
Start: 2018-10-17 | End: 2021-02-16

## 2021-02-16 RX ORDER — ESCITALOPRAM OXALATE 5 MG/1
5 TABLET ORAL
Qty: 30 | Refills: 0 | Status: DISCONTINUED | COMMUNITY
Start: 2019-10-09 | End: 2021-02-16

## 2021-02-16 NOTE — ASSESSMENT
[FreeTextEntry1] : obesity\par The diagnosis of obesity was discussed with the patient. The patient was counseled on diet and exercise. Patient was advised to eat a diet low in carbohydrates and low in fat with high protein diet with plenty of vegetables. Patient was counseled to eat small meals throughout the day avoiding carbohydrates, foods high in fat, foods that are fried and fast food.  Patient was advised to monitor fluid intake, to drink at least 8 glasses of pure water a day and reduce/stop intake of all sugar drinks including juice and soda. Patient was advised to try and exercise for 30-40 minutes per day for at least three days a week. Pros and cons of using medical management for weight loss versus diet/exercise alone was discussed. Medication options were provided for the patient and side affects and risks were identified and discussed.  Patient was advised to take any medications as prescribed and to call office or go to the ER immediately if any issues with the medications occur. All questions were answered.\par lost 40 pounds, increased exercise, changed diet, doing great\par \par PPD test (TB test) was placed in your forearm. Please return to the office in 48-72 hours for a reading, or have it read by any other health care professional. The reading must be done within that time frame.\par Watch for signs of redness and growth in the area of injection.  If you have any issues, pain or excessive redness return to the office or go directly to the ER.\par

## 2021-02-16 NOTE — PHYSICAL EXAM
[Well Nourished] : well nourished [Clear to Auscultation] : lungs were clear to auscultation bilaterally [Regular Rhythm] : with a regular rhythm [Coordination Grossly Intact] : coordination grossly intact [No Focal Deficits] : no focal deficits [Normal Affect] : the affect was normal [Normal Insight/Judgement] : insight and judgment were intact [Well Developed] : well developed [Normal S1, S2] : normal S1 and S2

## 2021-02-16 NOTE — HEALTH RISK ASSESSMENT
[Yes] : Yes [No falls in past year] : Patient reported no falls in the past year [3] : 1) Little interest or pleasure doing things for nearly every day (3) [Very Good] : ~his/her~  mood as very good [None] : None [With Family] : lives with family [Employed] : employed [Single] : single [Fully functional (bathing, dressing, toileting, transferring, walking, feeding)] : Fully functional (bathing, dressing, toileting, transferring, walking, feeding) [Fully functional (using the telephone, shopping, preparing meals, housekeeping, doing laundry, using] : Fully functional and needs no help or supervision to perform IADLs (using the telephone, shopping, preparing meals, housekeeping, doing laundry, using transportation, managing medications and managing finances) [] : No [[id number]] : 6 [[id number]] : 22 [de-identified] :

## 2021-02-16 NOTE — HISTORY OF PRESENT ILLNESS
[FreeTextEntry8] : 35 year old male is here for a followup visit. Patient is here for medication renewals and for blood work discussion. Medications and allergies were reviewed and assessed.  There has been no new medications since the last visit. \par needs ppd, lost weight, feeling good\par \par

## 2021-02-19 ENCOUNTER — APPOINTMENT (OUTPATIENT)
Dept: FAMILY MEDICINE | Facility: CLINIC | Age: 36
End: 2021-02-19

## 2021-03-03 ENCOUNTER — APPOINTMENT (OUTPATIENT)
Dept: FAMILY MEDICINE | Facility: CLINIC | Age: 36
End: 2021-03-03
Payer: COMMERCIAL

## 2021-03-03 VITALS
SYSTOLIC BLOOD PRESSURE: 115 MMHG | DIASTOLIC BLOOD PRESSURE: 75 MMHG | HEIGHT: 63 IN | BODY MASS INDEX: 32.25 KG/M2 | WEIGHT: 182 LBS | OXYGEN SATURATION: 96 % | TEMPERATURE: 96.6 F | HEART RATE: 104 BPM

## 2021-03-03 PROCEDURE — 99395 PREV VISIT EST AGE 18-39: CPT | Mod: 25

## 2021-03-03 PROCEDURE — 99072 ADDL SUPL MATRL&STAF TM PHE: CPT

## 2021-03-03 PROCEDURE — 36415 COLL VENOUS BLD VENIPUNCTURE: CPT

## 2021-03-03 NOTE — HEALTH RISK ASSESSMENT
[Very Good] : ~his/her~  mood as very good [] : No [Yes] : Yes [No falls in past year] : Patient reported no falls in the past year [3] : 2) Feeling down, depressed, or hopeless for nearly every day (3) [[id number]] : 6 [CWA0Ilcnx] : 22 [None] : None [With Family] : lives with family [Employed] : employed [Single] : single [Fully functional (bathing, dressing, toileting, transferring, walking, feeding)] : Fully functional (bathing, dressing, toileting, transferring, walking, feeding) [Fully functional (using the telephone, shopping, preparing meals, housekeeping, doing laundry, using] : Fully functional and needs no help or supervision to perform IADLs (using the telephone, shopping, preparing meals, housekeeping, doing laundry, using transportation, managing medications and managing finances) [de-identified] :

## 2021-03-03 NOTE — PHYSICAL EXAM
[Well Nourished] : well nourished [Well Developed] : well developed [Clear to Auscultation] : lungs were clear to auscultation bilaterally [Regular Rhythm] : with a regular rhythm [Normal S1, S2] : normal S1 and S2 [Coordination Grossly Intact] : coordination grossly intact [No Focal Deficits] : no focal deficits [Normal Affect] : the affect was normal [Normal Insight/Judgement] : insight and judgment were intact

## 2021-03-03 NOTE — ASSESSMENT
[FreeTextEntry1] : Patient was counseled on healthy eating habits, on daily exercise and stress relief. All medications and allergies were reviewed with the patient and any adjustments necessary were made. Patient was counseled to try engage in an exercise activity for at least 30 minutes 3-4 times a week.  Patient was advised to eat a diet low in fat and carbohydrates and high in protein, with plenty of vegetables. Patient was advised to try and engage in relaxing activities whenever possible.\par The patients blood was draw in office and will be followed and assessed for any issues.  Patient was told to return to the office if any issues arise.  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 The patient was counseled on diet and exercise. Patient was advised to eat a diet low in carbohydrates and low in fat with high protein diet with plenty of vegetables. Patient was counseled to eat small meals throughout the day avoiding carbohydrates, foods high in fat, foods that are fried and fast food.  Patient was advised to monitor fluid intake, to drink at least 8 glasses of pure water a day and reduce/stop intake of all sugar drinks including juice and soda. Patient was advised to try and exercise for 30-40 minutes per day for at least three days a week. Pros and cons of using medical management for weight loss versus diet/exercise alone was discussed. Medication options were provided for the patient and side affects and risks were identified and discussed.  Patient was advised to take any medications as prescribed and to call office or go to the ER immediately if any issues with the medications occur. All questions were answered.\par lost 40 pounds, increased exercise, changed diet, doing great\par \par

## 2021-03-04 LAB
ALBUMIN SERPL ELPH-MCNC: 5.1 G/DL
ALP BLD-CCNC: 100 U/L
ALT SERPL-CCNC: 25 U/L
ANION GAP SERPL CALC-SCNC: 10 MMOL/L
AST SERPL-CCNC: 18 U/L
BASOPHILS # BLD AUTO: 0.06 K/UL
BASOPHILS NFR BLD AUTO: 0.7 %
BILIRUB SERPL-MCNC: 0.3 MG/DL
BUN SERPL-MCNC: 25 MG/DL
CALCIUM SERPL-MCNC: 9.6 MG/DL
CHLORIDE SERPL-SCNC: 109 MMOL/L
CHOLEST SERPL-MCNC: 199 MG/DL
CO2 SERPL-SCNC: 24 MMOL/L
CREAT SERPL-MCNC: 0.91 MG/DL
EOSINOPHIL # BLD AUTO: 0.14 K/UL
EOSINOPHIL NFR BLD AUTO: 1.7 %
ESTIMATED AVERAGE GLUCOSE: 100 MG/DL
GLUCOSE SERPL-MCNC: 92 MG/DL
HBA1C MFR BLD HPLC: 5.1 %
HCT VFR BLD CALC: 48.9 %
HDLC SERPL-MCNC: 44 MG/DL
HGB BLD-MCNC: 15.7 G/DL
IMM GRANULOCYTES NFR BLD AUTO: 0.2 %
LDLC SERPL CALC-MCNC: 130 MG/DL
LYMPHOCYTES # BLD AUTO: 2.91 K/UL
LYMPHOCYTES NFR BLD AUTO: 36.3 %
MAN DIFF?: NORMAL
MCHC RBC-ENTMCNC: 28.6 PG
MCHC RBC-ENTMCNC: 32.1 GM/DL
MCV RBC AUTO: 89.1 FL
MONOCYTES # BLD AUTO: 0.58 K/UL
MONOCYTES NFR BLD AUTO: 7.2 %
NEUTROPHILS # BLD AUTO: 4.3 K/UL
NEUTROPHILS NFR BLD AUTO: 53.9 %
NONHDLC SERPL-MCNC: 155 MG/DL
PLATELET # BLD AUTO: 294 K/UL
POTASSIUM SERPL-SCNC: 4.2 MMOL/L
PROT SERPL-MCNC: 7.7 G/DL
RBC # BLD: 5.49 M/UL
RBC # FLD: 13.2 %
SARS-COV-2 IGG SERPL IA-ACNC: 0.07 INDEX
SARS-COV-2 IGG SERPL QL IA: NEGATIVE
SODIUM SERPL-SCNC: 144 MMOL/L
TRIGL SERPL-MCNC: 123 MG/DL
TSH SERPL-ACNC: 1.36 UIU/ML
WBC # FLD AUTO: 8.01 K/UL

## 2021-03-29 ENCOUNTER — TRANSCRIPTION ENCOUNTER (OUTPATIENT)
Age: 36
End: 2021-03-29

## 2021-03-30 ENCOUNTER — TRANSCRIPTION ENCOUNTER (OUTPATIENT)
Age: 36
End: 2021-03-30

## 2021-05-12 ENCOUNTER — TRANSCRIPTION ENCOUNTER (OUTPATIENT)
Age: 36
End: 2021-05-12

## 2021-05-17 ENCOUNTER — TRANSCRIPTION ENCOUNTER (OUTPATIENT)
Age: 36
End: 2021-05-17

## 2021-05-18 ENCOUNTER — TRANSCRIPTION ENCOUNTER (OUTPATIENT)
Age: 36
End: 2021-05-18

## 2021-07-14 ENCOUNTER — TRANSCRIPTION ENCOUNTER (OUTPATIENT)
Age: 36
End: 2021-07-14

## 2021-08-05 ENCOUNTER — TRANSCRIPTION ENCOUNTER (OUTPATIENT)
Age: 36
End: 2021-08-05

## 2021-08-21 ENCOUNTER — TRANSCRIPTION ENCOUNTER (OUTPATIENT)
Age: 36
End: 2021-08-21

## 2021-09-13 ENCOUNTER — TRANSCRIPTION ENCOUNTER (OUTPATIENT)
Age: 36
End: 2021-09-13

## 2021-09-20 ENCOUNTER — TRANSCRIPTION ENCOUNTER (OUTPATIENT)
Age: 36
End: 2021-09-20

## 2021-10-09 ENCOUNTER — TRANSCRIPTION ENCOUNTER (OUTPATIENT)
Age: 36
End: 2021-10-09

## 2021-10-13 ENCOUNTER — TRANSCRIPTION ENCOUNTER (OUTPATIENT)
Age: 36
End: 2021-10-13

## 2021-11-28 ENCOUNTER — TRANSCRIPTION ENCOUNTER (OUTPATIENT)
Age: 36
End: 2021-11-28

## 2021-11-30 ENCOUNTER — TRANSCRIPTION ENCOUNTER (OUTPATIENT)
Age: 36
End: 2021-11-30

## 2021-11-30 RX ORDER — ALBUTEROL SULFATE 90 UG/1
108 (90 BASE) INHALANT RESPIRATORY (INHALATION)
Qty: 1 | Refills: 0 | Status: ACTIVE | COMMUNITY
Start: 2018-10-17 | End: 1900-01-01

## 2021-12-01 ENCOUNTER — TRANSCRIPTION ENCOUNTER (OUTPATIENT)
Age: 36
End: 2021-12-01

## 2022-03-17 ENCOUNTER — LABORATORY RESULT (OUTPATIENT)
Age: 37
End: 2022-03-17

## 2022-04-02 ENCOUNTER — LABORATORY RESULT (OUTPATIENT)
Age: 37
End: 2022-04-02

## 2022-04-03 LAB
25(OH)D3 SERPL-MCNC: 44.6 NG/ML
ALBUMIN SERPL ELPH-MCNC: 4.9 G/DL
ALP BLD-CCNC: 103 U/L
ALT SERPL-CCNC: 22 U/L
ANION GAP SERPL CALC-SCNC: 11 MMOL/L
APPEARANCE: CLEAR
AST SERPL-CCNC: 17 U/L
BASOPHILS # BLD AUTO: 0.07 K/UL
BASOPHILS NFR BLD AUTO: 0.8 %
BILIRUB SERPL-MCNC: 0.2 MG/DL
BILIRUBIN URINE: NEGATIVE
BLOOD URINE: NEGATIVE
BUN SERPL-MCNC: 23 MG/DL
CALCIUM SERPL-MCNC: 9.2 MG/DL
CHLORIDE SERPL-SCNC: 109 MMOL/L
CHOLEST SERPL-MCNC: 157 MG/DL
CO2 SERPL-SCNC: 23 MMOL/L
COLOR: NORMAL
CREAT SERPL-MCNC: 0.87 MG/DL
EGFR: 116 ML/MIN/1.73M2
EOSINOPHIL # BLD AUTO: 0.12 K/UL
EOSINOPHIL NFR BLD AUTO: 1.3 %
ESTIMATED AVERAGE GLUCOSE: 108 MG/DL
GLUCOSE QUALITATIVE U: NEGATIVE
GLUCOSE SERPL-MCNC: 105 MG/DL
HBA1C MFR BLD HPLC: 5.4 %
HCT VFR BLD CALC: 46.6 %
HDLC SERPL-MCNC: 37 MG/DL
HGB BLD-MCNC: 15.3 G/DL
IMM GRANULOCYTES NFR BLD AUTO: 0.3 %
KETONES URINE: NEGATIVE
LDLC SERPL CALC-MCNC: 106 MG/DL
LEUKOCYTE ESTERASE URINE: ABNORMAL
LYMPHOCYTES # BLD AUTO: 2.49 K/UL
LYMPHOCYTES NFR BLD AUTO: 27.2 %
MAN DIFF?: NORMAL
MCHC RBC-ENTMCNC: 28.8 PG
MCHC RBC-ENTMCNC: 32.8 GM/DL
MCV RBC AUTO: 87.6 FL
MONOCYTES # BLD AUTO: 0.51 K/UL
MONOCYTES NFR BLD AUTO: 5.6 %
NEUTROPHILS # BLD AUTO: 5.94 K/UL
NEUTROPHILS NFR BLD AUTO: 64.8 %
NITRITE URINE: NEGATIVE
NONHDLC SERPL-MCNC: 119 MG/DL
PH URINE: 7
PLATELET # BLD AUTO: 265 K/UL
POTASSIUM SERPL-SCNC: 4.3 MMOL/L
PROT SERPL-MCNC: 7.1 G/DL
PROTEIN URINE: NEGATIVE
RBC # BLD: 5.32 M/UL
RBC # FLD: 14.1 %
SODIUM SERPL-SCNC: 143 MMOL/L
SPECIFIC GRAVITY URINE: 1.02
TRIGL SERPL-MCNC: 65 MG/DL
TSH SERPL-ACNC: 1.31 UIU/ML
UROBILINOGEN URINE: NORMAL
WBC # FLD AUTO: 9.16 K/UL

## 2022-04-04 ENCOUNTER — APPOINTMENT (OUTPATIENT)
Dept: FAMILY MEDICINE | Facility: CLINIC | Age: 37
End: 2022-04-04
Payer: COMMERCIAL

## 2022-04-04 VITALS
WEIGHT: 182 LBS | HEIGHT: 63 IN | SYSTOLIC BLOOD PRESSURE: 120 MMHG | OXYGEN SATURATION: 97 % | HEART RATE: 115 BPM | BODY MASS INDEX: 32.25 KG/M2 | DIASTOLIC BLOOD PRESSURE: 80 MMHG | TEMPERATURE: 97.5 F

## 2022-04-04 DIAGNOSIS — Z00.00 ENCOUNTER FOR GENERAL ADULT MEDICAL EXAMINATION W/OUT ABNORMAL FINDINGS: ICD-10-CM

## 2022-04-04 PROCEDURE — 99395 PREV VISIT EST AGE 18-39: CPT

## 2022-04-04 NOTE — ASSESSMENT
[FreeTextEntry1] : Patient was counseled on healthy eating habits, on daily exercise and stress relief. All medications and allergies were reviewed with the patient and any adjustments necessary were made. Patient was counseled to try engage in an exercise activity for at least 30 minutes 3-4 times a week.  Patient was advised to eat a diet low in fat and carbohydrates and high in protein, with plenty of vegetables. 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 The patient was counseled on diet and exercise. Patient was advised to eat a diet low in carbohydrates and low in fat with high protein diet with plenty of vegetables. Patient was counseled to eat small meals throughout the day avoiding carbohydrates, foods high in fat, foods that are fried and fast food.  Patient was advised to monitor fluid intake, to drink at least 8 glasses of pure water a day and reduce/stop intake of all sugar drinks including juice and soda. Patient was advised to try and exercise for 30-40 minutes per day for at least three days a week. Pros and cons of using medical management for weight loss versus diet/exercise alone was discussed. Medication options were provided for the patient and side affects and risks were identified and discussed.  Patient was advised to take any medications as prescribed and to call office or go to the ER immediately if any issues with the medications occur. All questions were answered.\par lost 40 pounds, increased exercise, changed diet, doing great\par keeping it off, running \par \par

## 2022-04-04 NOTE — HISTORY OF PRESENT ILLNESS
[FreeTextEntry8] : 36 year old male  here for annual well visit. Patient's blood work was drawn and medications reviewed. Patient's past medical history was reviewed, allergies verified and problems were identified and assessed. Patients medications were reviewed. Patient is feeling well with no new or active complaints at this time.\par losing weight, running, full time teacher\par

## 2022-04-04 NOTE — HEALTH RISK ASSESSMENT
[Very Good] : ~his/her~  mood as very good [Yes] : Yes [No falls in past year] : Patient reported no falls in the past year [3] : 2) Feeling down, depressed, or hopeless for nearly every day (3) [[id number]] : 6 [None] : None [With Family] : lives with family [Employed] : employed [Single] : single [Fully functional (bathing, dressing, toileting, transferring, walking, feeding)] : Fully functional (bathing, dressing, toileting, transferring, walking, feeding) [Fully functional (using the telephone, shopping, preparing meals, housekeeping, doing laundry, using] : Fully functional and needs no help or supervision to perform IADLs (using the telephone, shopping, preparing meals, housekeeping, doing laundry, using transportation, managing medications and managing finances) [de-identified] :  for [employer]

## 2022-05-29 ENCOUNTER — TRANSCRIPTION ENCOUNTER (OUTPATIENT)
Age: 37
End: 2022-05-29

## 2022-05-30 ENCOUNTER — TRANSCRIPTION ENCOUNTER (OUTPATIENT)
Age: 37
End: 2022-05-30

## 2022-06-03 ENCOUNTER — APPOINTMENT (OUTPATIENT)
Dept: FAMILY MEDICINE | Facility: CLINIC | Age: 37
End: 2022-06-03
Payer: COMMERCIAL

## 2022-06-03 VITALS
TEMPERATURE: 96.9 F | OXYGEN SATURATION: 96 % | HEART RATE: 99 BPM | HEIGHT: 63 IN | WEIGHT: 182 LBS | DIASTOLIC BLOOD PRESSURE: 77 MMHG | BODY MASS INDEX: 32.25 KG/M2 | SYSTOLIC BLOOD PRESSURE: 109 MMHG

## 2022-06-03 DIAGNOSIS — R19.7 DIARRHEA, UNSPECIFIED: ICD-10-CM

## 2022-06-03 PROCEDURE — 99212 OFFICE O/P EST SF 10 MIN: CPT

## 2022-06-03 NOTE — HISTORY OF PRESENT ILLNESS
[FreeTextEntry8] : Pt complained 2 days history of diarrhea and abdominal cramping. Pt ate grapes from supermarket and developed diarrhea after that. Pt took OTC imodium that didn't help him quick enough. Today stool is more firm. Pt came to office for evaluation.

## 2022-06-03 NOTE — PHYSICAL EXAM
[Soft] : abdomen soft [de-identified] : Mild tender at right middle abdomen, 3-4/10, no rebound tenderness, bowel sound slightly hyperactive.

## 2022-06-03 NOTE — REVIEW OF SYSTEMS
[Abdominal Pain] : abdominal pain [Diarrhea] : diarrhea [Nausea] : no nausea [Constipation] : no constipation [Vomiting] : no vomiting [Heartburn] : no heartburn [Melena] : no melena

## 2022-06-09 ENCOUNTER — TRANSCRIPTION ENCOUNTER (OUTPATIENT)
Age: 37
End: 2022-06-09

## 2022-06-10 ENCOUNTER — APPOINTMENT (OUTPATIENT)
Dept: FAMILY MEDICINE | Facility: CLINIC | Age: 37
End: 2022-06-10
Payer: COMMERCIAL

## 2022-06-10 DIAGNOSIS — K64.9 UNSPECIFIED HEMORRHOIDS: ICD-10-CM

## 2022-06-10 DIAGNOSIS — K59.00 CONSTIPATION, UNSPECIFIED: ICD-10-CM

## 2022-06-10 PROCEDURE — 99213 OFFICE O/P EST LOW 20 MIN: CPT | Mod: 95

## 2022-06-10 RX ORDER — FLUTICASONE PROPIONATE 50 UG/1
50 SPRAY, METERED NASAL TWICE DAILY
Qty: 1 | Refills: 2 | Status: DISCONTINUED | COMMUNITY
Start: 2020-01-15 | End: 2022-06-10

## 2022-06-10 RX ORDER — IBUPROFEN 800 MG/1
800 TABLET, FILM COATED ORAL
Qty: 21 | Refills: 0 | Status: DISCONTINUED | COMMUNITY
Start: 2020-11-17 | End: 2022-06-10

## 2022-06-10 NOTE — HISTORY OF PRESENT ILLNESS
[Other Location: e.g. School (Enter Location, City,State)___] : at [unfilled], at the time of the visit. [Medical Office: ([facility])___] : at the medical office located in  [Verbal consent obtained from patient] : the patient, [unfilled] [FreeTextEntry8] : Had diarrhea last week had to do BRAT diet to help with those symptoms.\par -Stopped the BRAT diet on Sunday. \par Now with constipation and hemorrhoid flaring.  Seeing blood in stool/toilet bowl. \par \par Eating a lot of vegetables; drinking plenty of water. \par Went to the bathroom today-hard stools; reports rectal pain.  Used prep H cream.  Took stool softeners (red and white).  \par Did not take any stool softeners yet today.  \par \par Has had problems with hemorrhoids in the past.\par Medications and allergies reviewed.\par

## 2022-06-10 NOTE — PLAN
[FreeTextEntry1] : Discussed bowel regimen; try introducing prune juice.  Advised to take stool softener regularly to help soften stool as part of regimen. \par Can take Colace twice a day.  Can also try senna. \par Will send ointment. \par Use sitz bath. \par \par If recurrent hemorrhoid issues-recommending Colorectal eval. \par \par Discussed with Mr. HIDALGO precautions and advised to go to seek immediate medical re-evaluation if condition worsened.  Mr. LAZARO HIDALGO expressed understanding of the plan.\par

## 2022-06-10 NOTE — PHYSICAL EXAM
[Normal] : affect was normal and insight and judgment were intact [de-identified] : No visible respiratory distress [de-identified] : No rash

## 2022-06-14 ENCOUNTER — TRANSCRIPTION ENCOUNTER (OUTPATIENT)
Age: 37
End: 2022-06-14

## 2022-06-17 ENCOUNTER — TRANSCRIPTION ENCOUNTER (OUTPATIENT)
Age: 37
End: 2022-06-17

## 2022-07-13 ENCOUNTER — TRANSCRIPTION ENCOUNTER (OUTPATIENT)
Age: 37
End: 2022-07-13

## 2022-07-17 ENCOUNTER — TRANSCRIPTION ENCOUNTER (OUTPATIENT)
Age: 37
End: 2022-07-17

## 2022-08-01 ENCOUNTER — TRANSCRIPTION ENCOUNTER (OUTPATIENT)
Age: 37
End: 2022-08-01

## 2022-08-06 ENCOUNTER — TRANSCRIPTION ENCOUNTER (OUTPATIENT)
Age: 37
End: 2022-08-06

## 2022-08-16 ENCOUNTER — TRANSCRIPTION ENCOUNTER (OUTPATIENT)
Age: 37
End: 2022-08-16

## 2022-08-17 ENCOUNTER — APPOINTMENT (OUTPATIENT)
Dept: FAMILY MEDICINE | Facility: CLINIC | Age: 37
End: 2022-08-17

## 2022-08-17 ENCOUNTER — TRANSCRIPTION ENCOUNTER (OUTPATIENT)
Age: 37
End: 2022-08-17

## 2022-08-24 ENCOUNTER — TRANSCRIPTION ENCOUNTER (OUTPATIENT)
Age: 37
End: 2022-08-24

## 2022-08-25 ENCOUNTER — TRANSCRIPTION ENCOUNTER (OUTPATIENT)
Age: 37
End: 2022-08-25

## 2022-08-29 ENCOUNTER — TRANSCRIPTION ENCOUNTER (OUTPATIENT)
Age: 37
End: 2022-08-29

## 2022-09-14 ENCOUNTER — TRANSCRIPTION ENCOUNTER (OUTPATIENT)
Age: 37
End: 2022-09-14

## 2022-11-19 ENCOUNTER — APPOINTMENT (OUTPATIENT)
Dept: FAMILY MEDICINE | Facility: CLINIC | Age: 37
End: 2022-11-19

## 2022-11-19 VITALS
HEART RATE: 111 BPM | WEIGHT: 204 LBS | BODY MASS INDEX: 36.14 KG/M2 | DIASTOLIC BLOOD PRESSURE: 78 MMHG | OXYGEN SATURATION: 96 % | SYSTOLIC BLOOD PRESSURE: 120 MMHG | HEIGHT: 63 IN | TEMPERATURE: 98.4 F

## 2022-11-19 DIAGNOSIS — R45.89 OTHER SYMPTOMS AND SIGNS INVOLVING EMOTIONAL STATE: ICD-10-CM

## 2022-11-19 PROCEDURE — 99214 OFFICE O/P EST MOD 30 MIN: CPT | Mod: 25

## 2022-11-19 PROCEDURE — 96372 THER/PROPH/DIAG INJ SC/IM: CPT

## 2022-11-19 RX ORDER — CYANOCOBALAMIN 1000 UG/ML
1000 INJECTION INTRAMUSCULAR; SUBCUTANEOUS
Qty: 0 | Refills: 0 | Status: COMPLETED | OUTPATIENT
Start: 2022-11-19

## 2022-11-19 RX ADMIN — CYANOCOBALAMIN 1 MCG/ML: 1000 INJECTION, SOLUTION INTRAMUSCULAR at 00:00

## 2022-11-19 NOTE — HISTORY OF PRESENT ILLNESS
[de-identified] : TG was 204, vitamin B 12 308 and vit D 18.5. Pt was recalled back for abnormal test reports. Reports were reviewed with pt in details. Other blood tests came back wnl. \par Pt gained 22 lbs in past 5 months due to personal issue and stress from work. Pt ate a lot including bedtime eating. Acid reflux got worse. Pt has been taking omeprazole 40 mg daily. Pt felt tired and cold. Thyroid function is wnl. Pt ate chicken most of the time, but not enough vegetables. Pt didn't do much exercise due to knee pain from 22 lbs weight gain in past 5 months. \par Pt stated that he usually doesn't do well during winter time due to darkness and coldness.

## 2022-11-20 ENCOUNTER — TRANSCRIPTION ENCOUNTER (OUTPATIENT)
Age: 37
End: 2022-11-20

## 2022-11-21 ENCOUNTER — TRANSCRIPTION ENCOUNTER (OUTPATIENT)
Age: 37
End: 2022-11-21

## 2022-12-01 ENCOUNTER — TRANSCRIPTION ENCOUNTER (OUTPATIENT)
Age: 37
End: 2022-12-01

## 2022-12-02 ENCOUNTER — APPOINTMENT (OUTPATIENT)
Dept: INTERNAL MEDICINE | Facility: CLINIC | Age: 37
End: 2022-12-02

## 2022-12-02 PROCEDURE — 99442: CPT

## 2022-12-02 NOTE — PHYSICAL EXAM
[de-identified] : TEB. General: Well-developed well-nourished in no apparent distress. Appears mildly ill. \par Respiratory: Speaking in complete sentences, no respiratory distress noted.\par Neuro: No focal deficits noted.\par

## 2022-12-02 NOTE — REVIEW OF SYSTEMS
[FreeTextEntry2] : Constitutional:  no fever and no chills. \par Eyes:  no discharge. \par HEENT:  no earache. \par Cardiovascular:  no chest pain, no palpitations and no lower extremity edema. \par Respiratory:  no shortness of breath, no wheezing and no cough. \par Gastrointestinal:  no abdominal pain, no nausea and no vomiting. \par Genitourinary:  no dysuria. \par Musculoskeletal:  no joint pain. \par Integumentary:  no itching. \par Neurological:  no headache. \par Psychiatric:  not suicidal. \par Hematologic/Lymphatic:  no easy bleeding. [FreeTextEntry4] : see hpi [FreeTextEntry6] : see hpi

## 2022-12-02 NOTE — ASSESSMENT
[FreeTextEntry1] : LAZARO HIDALGO  is a 37 year old male  with history of  Asthma, seizure d/o presented today for COVID infection.\par \par #COVID-19 infection  with risk factors for severe infection\par \par Discussed with patient criteria for Oral Treatment including Paxlovid as first line treatment: informed pt on possible drug drug interaction  (   topiramate, no known interactions          )with Paxlovid.\par Sent Rx for Paxlovid to [facility]. Benefit/risk of medication was explained and pt expressed understood. \par \par Supportive care with Tylenol/ibuprofen as needed, drink plenty of fluids, Mucinex DM for cough, keep windows cracked open and a fan on. Consider pronation and frequent ambulation. Discussed need for isolation and quarantine for those exposed but testing pending. Advised patient to stay at home and rest. Advised calling the office if a high fever develops, if she becomes short of breath, or develops severe or worsening cough. Advised patient to go to the ER if experiencing trouble breathing or if shortness of breath is severe. Advised staying well hydrated with at least 6-8 glasses of water/fluid daily, try to eat small meals even if without an appetite, and to call the office for any issues or concerns. Advised getting Pulse oximeter to monitor O2 sats. If SpO2<90% should go to ED for oxygen therapy. Needs to quarantine for minimum 5 days until asymptomatic and no fever without any fever lowering medications.\par f/up with PCP.\par \par \par

## 2022-12-02 NOTE — HISTORY OF PRESENT ILLNESS
[Home] : at home, [unfilled] , at the time of the visit. [Medical Office: ([facility])___] : at the medical office located in  [Verbal consent obtained from patient] : the patient, [unfilled] [FreeTextEntry8] : LAZARO HIDALGO  is a 37 year old male  with history of  Asthma, seizure d/o presented today for COVID infection.\par We tried multiple times for TEB via Solo platform but it did not work. By Pt's agreement, we changed visit to TTM.\par Reported no seizure and stable. \par \par Reports that they tested positive on    12/1/22    by PCR test at Urgent care.\par Date symptoms started 12/1/22\par Today is day 2\par \par Pt frustrated as his rx for Paxlovid by  did not go through to [facility] and he needs it now. \par  gave him Albuterol inhaler and prednisone that are on the way of delivery.\par He lives alone and does not want to go out to pharmacy.\par It's his first time COVID infection.\par \par Symptoms include: Nasal congestion, sore throat, cough, fatigue,  severe myalgias\par Denies loss of smell and taste, fever, CP, shortness of breath, dyspnea on exertion, abdominal pain, diarrhea, nausea and vomiting\par \par Taking the following medications for symptom relief: Advil\par \par Patient reports exposure to known case of Covid 19: No\par COVID vaccination: fully vaccinated\par Quarantine: pt is using a separate room and a toilet in house.Pt lives alone.

## 2022-12-10 ENCOUNTER — APPOINTMENT (OUTPATIENT)
Dept: FAMILY MEDICINE | Facility: CLINIC | Age: 37
End: 2022-12-10

## 2022-12-10 PROCEDURE — 99441: CPT

## 2022-12-16 ENCOUNTER — TRANSCRIPTION ENCOUNTER (OUTPATIENT)
Age: 37
End: 2022-12-16

## 2022-12-20 ENCOUNTER — APPOINTMENT (OUTPATIENT)
Dept: FAMILY MEDICINE | Facility: CLINIC | Age: 37
End: 2022-12-20

## 2022-12-20 PROCEDURE — 99442: CPT

## 2022-12-20 RX ORDER — NIRMATRELVIR AND RITONAVIR 300-100 MG
20 X 150 MG & KIT ORAL
Qty: 30 | Refills: 0 | Status: DISCONTINUED | COMMUNITY
Start: 2022-12-02 | End: 2022-12-20

## 2022-12-20 NOTE — PLAN
[FreeTextEntry1] : Fatigue-will go to lab-labs from Nov reviewed. \par Now on B12 supplement. \par \par s/p COVID-discussed Pulm evaluation if not improving.  Breathing improved with Flovent use. \par Will adjust meds based on labs. \par \par Patient expressed understanding of plan.\par

## 2022-12-20 NOTE — HISTORY OF PRESENT ILLNESS
[Medical Office: ([facility])___] : at the medical office located in  [Verbal consent obtained from patient] : the patient, [unfilled] [FreeTextEntry8] : Switched to telephonic visit-video feed stopped working. \par \par Follow-up for fatigue post COVID.\par Has started on Flovent, and breathing is better.  Not needing rescue inhaler as much. \par \par Notes he has gained weight, was not able to go to the gym. \par \par Does feel foggy. \par

## 2022-12-20 NOTE — REVIEW OF SYSTEMS
[Fatigue] : fatigue [Negative] : Gastrointestinal [Fever] : no fever [Chills] : no chills [Earache] : no earache [Sore Throat] : no sore throat [Chest Pain] : no chest pain [Cough] : no cough [FreeTextEntry6] : started flovent [de-identified] : 5-6 hours of sleep per night

## 2023-01-19 ENCOUNTER — NON-APPOINTMENT (OUTPATIENT)
Age: 38
End: 2023-01-19

## 2023-03-01 NOTE — HISTORY OF PRESENT ILLNESS
[FreeTextEntry8] : 34 year old male here with complaints of runny nose, cough, congestion for a week, chills, fever\par was here 10 days ago, got better, and now feels worse\par  Patients active medications, allergies and issues were all reviewed with the patient at time of visit.\par 
ryan all pertinent systems normal

## 2023-06-06 ENCOUNTER — TRANSCRIPTION ENCOUNTER (OUTPATIENT)
Age: 38
End: 2023-06-06

## 2023-07-08 ENCOUNTER — APPOINTMENT (OUTPATIENT)
Dept: ORTHOPEDIC SURGERY | Facility: CLINIC | Age: 38
End: 2023-07-08
Payer: COMMERCIAL

## 2023-07-08 VITALS — BODY MASS INDEX: 36.14 KG/M2 | HEIGHT: 63 IN | WEIGHT: 204 LBS

## 2023-07-08 PROCEDURE — 73562 X-RAY EXAM OF KNEE 3: CPT | Mod: RT

## 2023-07-08 PROCEDURE — 73030 X-RAY EXAM OF SHOULDER: CPT | Mod: RT

## 2023-07-08 PROCEDURE — 99204 OFFICE O/P NEW MOD 45 MIN: CPT

## 2023-07-09 NOTE — IMAGING
[de-identified] : \par ----------------------------------------------------------------------------\par \par Right shoulder exam: \par \par Inspection: no obvious deformity, no obvious masses, no swelling, no effusion, no atrophy\par ROM: \par    FF: 180\par    ER: 70\par    IR: T12\par Tenderness:\par    (-) Anterior/Biceps: \par    (+) Posterior\par    (+) Lateral\par    (-) Trapezius\par    (-) Scapula\par    (-) AC joint\par    (-) Crepitus with ROM\par Additional tests: \par    (+) Carin\par    (+) Hawkin's\par Strength: \par    FF: 5/5\par    ER: 5/5\par    IR: 5/5\par    Biceps: 5/5\par    Triceps: 5/5\par    Distal: 5/5\par Neuro: In tact to light touch throughout\par Vascularity: Extremity warm and well perfused\par  \par ---------------------------------------------------\par \par Right Knee Exam:                         	 \par \par General: no distress, no ligamentous laxity\par Skin: no significant pertinent finding\par Inspection: \par  Effusion: (min)\par  Malalignment: (-)\par  Swelling: (-)\par  Quad atrophy: (-)\par  J-sign: (-)\par ROM: \par  0 - 130 degrees of flexion.\par Tenderness: \par  MJLT: (+)\par  MFC. (-)\par  LJLT: (-)\par  Medial patellar facet tenderness: -\par  Lateral patellar facet tenderness: -\par  Crepitus: (+)\par  Patellar grind tenderness: (-)\par  Patellar tendon: (-)\par Stability: \par  Lachman: (-)\par  Varus/Valgus instability: (-)\par  Posterior drawer: (-)\par  Patellar translation: wnl\par Additional tests: \par  McMurrays test: (+)\par  Patellar apprehension: (+)\par  Patellar tilt: (-)\par  Tight lateral retinaculum: (-)\par Strength: 5/5 Q/H/TA/GS/EHL\par Neuro: In tact to light touch throughout, DTR's wnl\par Vascularity: Extremity warm and well perfused\par Gait: non antalgic\par \par   [Right] : right knee [All Views] : anteroposterior, lateral, skyline, and anteroposterior standing [FreeTextEntry1] : no obvious fracture, subluxation or malalignment  [FreeTextEntry9] : patella kenney

## 2023-07-09 NOTE — HISTORY OF PRESENT ILLNESS
[7] : 7 [0] : 0 [Dull/Aching] : dull/aching [Localized] : localized [Intermittent] : intermittent [Standing] : standing [Walking] : walking [Stairs] : stairs [Full time] : Work status: full time [de-identified] : 37 yo (ambidextrous) M here for eval of right shoulder and right knee pain. No injury. Pain worsening over the past weeks. R shoulder pain is anterior/ posterior. worse with reaching overhead/ behind. localized to shoulder. no pain radiating down arm. no N/T. Knee pain is anterior/ around kneecap. notes clicking/ popping. worse with stairs. no swelling. no mech sx. tried motrin with mild help. \par \par h/o L knee pain which he did PT for\par \par walks/ jogs for fitness [] : Post Surgical Visit: no [FreeTextEntry1] : Rt shoulder/knee

## 2023-07-09 NOTE — DISCUSSION/SUMMARY
[de-identified] : Reviewed Xrays, discussed diagnosis, answered questions\par Reassurance, Cryotherapy, activity mod, rest\par \par - ibuprofen 400 \par - PT\par \par FU with [facility] 4-6 wks\par \par \par -----------------------------------------------\par \par Patient warned of specific risks of medication related to bleeding, GI issues, increase blood pressure, and cardiac risks in addition to additional risks. Patient advised to discuss with PMD if any presence of stated issues.

## 2023-07-16 ENCOUNTER — NON-APPOINTMENT (OUTPATIENT)
Age: 38
End: 2023-07-16

## 2023-07-17 ENCOUNTER — TRANSCRIPTION ENCOUNTER (OUTPATIENT)
Age: 38
End: 2023-07-17

## 2023-07-20 ENCOUNTER — TRANSCRIPTION ENCOUNTER (OUTPATIENT)
Age: 38
End: 2023-07-20

## 2023-07-21 ENCOUNTER — APPOINTMENT (OUTPATIENT)
Dept: FAMILY MEDICINE | Facility: CLINIC | Age: 38
End: 2023-07-21
Payer: COMMERCIAL

## 2023-07-21 VITALS
HEART RATE: 92 BPM | SYSTOLIC BLOOD PRESSURE: 129 MMHG | DIASTOLIC BLOOD PRESSURE: 82 MMHG | BODY MASS INDEX: 35.79 KG/M2 | OXYGEN SATURATION: 96 % | WEIGHT: 202 LBS | HEIGHT: 63 IN | TEMPERATURE: 98.7 F

## 2023-07-21 PROCEDURE — 99214 OFFICE O/P EST MOD 30 MIN: CPT

## 2023-07-21 RX ORDER — FLUTICASONE PROPIONATE 110 UG/1
110 AEROSOL, METERED RESPIRATORY (INHALATION) TWICE DAILY
Qty: 1 | Refills: 1 | Status: DISCONTINUED | COMMUNITY
Start: 2022-12-10 | End: 2023-07-21

## 2023-07-21 NOTE — HISTORY OF PRESENT ILLNESS
[FreeTextEntry8] : Pt developed coughing, wheezing, sob and fatigue since 1-2 weeks ago. Pt thinks symptoms are due to air pollute and poor air quality. Pt has asthma intermittent, used albuterol pump as needed. This time albuterol pump didn't help. Pt went to urgent care center in 5 days ago where pt was prescribed symbicort 80-4.5, 2 puffs bid, montelukast 10 mg daily and mucinex 1 bid prn. Pt took and used these medications, symptoms continued. Pt felt chest tight and couldn't breath well. No more wheezing.

## 2023-07-21 NOTE — REVIEW OF SYSTEMS
[Fever] : no fever [Chills] : no chills [Sore Throat] : no sore throat [Shortness Of Breath] : shortness of breath [Wheezing] : wheezing

## 2023-08-02 ENCOUNTER — APPOINTMENT (OUTPATIENT)
Dept: PULMONOLOGY | Facility: CLINIC | Age: 38
End: 2023-08-02

## 2023-08-02 ENCOUNTER — TRANSCRIPTION ENCOUNTER (OUTPATIENT)
Age: 38
End: 2023-08-02

## 2023-08-02 ENCOUNTER — APPOINTMENT (OUTPATIENT)
Dept: PULMONOLOGY | Facility: CLINIC | Age: 38
End: 2023-08-02
Payer: COMMERCIAL

## 2023-08-02 VITALS
OXYGEN SATURATION: 98 % | HEART RATE: 89 BPM | BODY MASS INDEX: 35.79 KG/M2 | RESPIRATION RATE: 16 BRPM | HEIGHT: 63 IN | WEIGHT: 202 LBS | SYSTOLIC BLOOD PRESSURE: 113 MMHG | TEMPERATURE: 98.8 F | DIASTOLIC BLOOD PRESSURE: 75 MMHG

## 2023-08-02 DIAGNOSIS — Z82.5 FAMILY HISTORY OF ASTHMA AND OTHER CHRONIC LOWER RESPIRATORY DISEASES: ICD-10-CM

## 2023-08-02 PROCEDURE — 99205 OFFICE O/P NEW HI 60 MIN: CPT

## 2023-08-02 RX ORDER — FLUTICASONE PROPIONATE 110 UG/1
110 AEROSOL, METERED RESPIRATORY (INHALATION) TWICE DAILY
Qty: 1 | Refills: 3 | Status: DISCONTINUED | COMMUNITY
Start: 2022-12-10 | End: 2023-08-02

## 2023-08-02 NOTE — HISTORY OF PRESENT ILLNESS
[Never] : never [TextBox_4] : 37yo man never smoker w/ asthma (dx in childhood), GERD w/ hiatal hernia, obesity, referred by PMD to establish pulmonary care for asthma, with recent exacerbation about 2 weeks ago.   Began feeling ill 7/17 - coughing with yellow sputa, wheezing, chest tightness and DOMINGUEZ. No measured fevers or chills or other B-symptoms. ROS +congestion, rhinorrhea and PND. Went to  7/19, was given symbicort, mucinex and singulair but no steroids. Did not feel better. Albuterol inhaler only temporized asthma sx. Saw PMD 7/21 and given medrol dose gaurav with marked improvement in asthma symptoms. Today c/o bothersome cough that is still intermittently productive but now clear mucus if any. Cough is source of distress as it affects him throughout day and interferes with sleep. Been using symbicort PRN and albuterol PRN. No longer wheezing and overall improving. Last exacerbation prior to this was Dec 2022 when he got COVID-19 (fully vaccinated + boosters). Asthma was generally controlled with PRN JAQUAN most of his life but this year he says has been awful and attributes the poor air quality and pollution this season to his diminished overall health. Has not had a PFT in years. Has gained weight as he has not been exercising much this season and is anxious about seeing GI MD to address his GERD and hiatal hernia.   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

## 2023-08-02 NOTE — REVIEW OF SYSTEMS
[Cough] : cough [Sputum] : sputum [SOB on Exertion] : sob on exertion [GERD] : gerd [Headache] : headache [Obesity] : obesity [Negative] : Psychiatric

## 2023-08-02 NOTE — ASSESSMENT
[FreeTextEntry1] : 39yo man never smoker w/ asthma (dx in childhood), GERD w/ hiatal hernia, obesity, referred by PMD to establish pulmonary care for asthma, with recent exacerbation about 2 weeks ago likely 2/2 viral URI with asthma sx improved but still with persistent cough - likely reflecting post-viral cough syndrome by timeline.   Data Reviewed: Recent PMD charts 7/21 UC visit summary 7/17  Impression: #Asthma, convalesced from exacerbation 2 weeks ago #Post-viral cough syndrome #GERD  Plan: - cont albuterol MDI q4-6h PRN wheezing/dyspnea or chest tightness - completed medrol dose gaurav already - advised to continue Symbicort 80/4.5mcg 2 puffs BID standing (not PRN) and reminded him he must rinse mouth after each dose; pt already comfortable with MDI use from using albuterol ; new rx provided with refills - suggested OTC Robitussin DM PRN over the next 3-5 days when cough sx are particularly bothersome - thoroughly counseled on expectations for cough to resolve over next 7-10 days from suspected viral trigger to his asthma - full PFT ordered to be done in about 2 weeks, deferred today d/t recovering from illness - defer asthma labs, eosinophil count as he recently finished course of steroids, discussed obtaining at next f/u visit - stop Singulair for now, may re-introduce at later date pending progress - stop Flovent in favor of Symbicort - can cont PPI for GERD for now; provided referral information to establish with gastroenterologist at his request d/t hiatal hernia  - encouraged to gradually resume exertional activities/exercise as tolerated  RTO 1 month

## 2023-08-02 NOTE — PHYSICAL EXAM
[No Acute Distress] : no acute distress [Normal Oropharynx] : normal oropharynx [II] : Mallampati Class: II [Normal Appearance] : normal appearance [No Neck Mass] : no neck mass [Normal Rate/Rhythm] : normal rate/rhythm [Normal S1, S2] : normal s1, s2 [No Resp Distress] : no resp distress [Clear to Auscultation Bilaterally] : clear to auscultation bilaterally [No Abnormalities] : no abnormalities [Normal Gait] : normal gait [No Clubbing] : no clubbing [No Edema] : no edema [Normal Color/ Pigmentation] : normal color/ pigmentation [No Focal Deficits] : no focal deficits [Oriented x3] : oriented x3 [Normal Affect] : normal affect [TextBox_2] : fatigued

## 2023-08-16 ENCOUNTER — APPOINTMENT (OUTPATIENT)
Dept: PULMONOLOGY | Facility: CLINIC | Age: 38
End: 2023-08-16
Payer: COMMERCIAL

## 2023-08-16 ENCOUNTER — APPOINTMENT (OUTPATIENT)
Dept: ORTHOPEDIC SURGERY | Facility: CLINIC | Age: 38
End: 2023-08-16
Payer: COMMERCIAL

## 2023-08-16 VITALS
DIASTOLIC BLOOD PRESSURE: 78 MMHG | BODY MASS INDEX: 38.17 KG/M2 | SYSTOLIC BLOOD PRESSURE: 135 MMHG | WEIGHT: 210.1 LBS | OXYGEN SATURATION: 97 % | HEART RATE: 94 BPM | HEIGHT: 62.01 IN

## 2023-08-16 PROCEDURE — 94729 DIFFUSING CAPACITY: CPT

## 2023-08-16 PROCEDURE — 94060 EVALUATION OF WHEEZING: CPT

## 2023-08-16 PROCEDURE — 94726 PLETHYSMOGRAPHY LUNG VOLUMES: CPT

## 2023-08-16 PROCEDURE — 99214 OFFICE O/P EST MOD 30 MIN: CPT

## 2023-08-16 NOTE — DISCUSSION/SUMMARY
[de-identified] : Assessment: The patient is a 38 year old male with right shoulder and right knee pain and physical exam findings consistent with patellofemoral pain syndrome and shoulder impingement   Patient and I discussed their symptoms. Discussed findings of today's exam and possible causes of patient's pain. Educated patient on their most probable diagnosis. Reviewed possible courses of treatment, and we collaboratively decided best course of treatment at this time will include:  1. c/w PT for shoulder and knee 2. Diclofenac rx - GI precautions reviewed  3. If knee pain persists, will get mri of knee at next visit to [facility] for mmt   The patient's current medication management of their orthopedic diagnosis was reviewed today:   (1) We discussed a comprehensive treatment plan that included possible pharmaceutical management involving the use of prescription strength medications including but not limited to options such as oral Naprosyn 500mg BID, once daily Meloxicam 15 mg, or 500-650 mg Tylenol versus over the counter oral medications and topical prescription NSAID Pennsaid vs over the counter Voltaren gel.   (2) There is a moderate risk of morbidity with further treatment, especially from use of prescription strength medications and possible side effects of these medications which include upset stomach with oral medications, skin reactions to topical medications and cardiac/renal issues with long term use.   (3) I recommended that the patient follow-up with their medical physician to discuss any significant specific potential issues with long term medication use such as interactions with current medications or with exacerbation of underlying medical comorbidities.   (4) The benefits and risks associated with use of injectable, oral or topical, prescription and over the counter anti-inflammatory medications were discussed with the patient. The patient voiced understanding of the risks including but not limited to bleeding, stroke, kidney dysfunction, heart disease, and were referred to the black box warning label for further information.  Prior to appointment and during encounter with patient extensive medical records were reviewed including but not limited to, hospital records, out patient records, imaging results, and lab data. During this appointment the patient was examined, diagnoses were discussed and explained in a face to face manner. In addition extensive time was spent reviewing aforementioned diagnostic studies. Counseling including abnormal image results, differential diagnoses, treatment options, risk and benefits, lifestyle changes, current condition, and current medications was performed. Patient's comments, questions, and concerns were address and patient verbalized understanding.  Follow up in 6 weeks

## 2023-08-16 NOTE — IMAGING
[de-identified] : The patient is a well appearing 38 year old male of their stated age. Patient ambulates with a crutches. Negative straight leg raise bilateral  Effected Knee:  right knee ROM:  0-130 degrees  Lachman: Negative Pivot Shift: Negative Anterior Drawer: Negative Posterior Drawer / Sag: Negative Varus Stress 0 degrees: Stable Varus Stress 30 degrees: Stable Valgus Stress 0 degrees: Stable Valgus Stress 30 degrees: Stable Medial Lincoln: Positive Lateral Lincoln: Negative Patella Glide: 2+ Patella Apprehension: Negative Patella Grind: Negative  Palpation: Medial Joint Line: TTP Lateral Joint Line: Nontender Medial Collateral Ligament: Nontender Lateral Collateral Ligament/PLC: Nontender Distal Femur: Nontender Proximal Tibia: Nontender Tibial Tubercle: Nontender Distal Pole Patella: Nontender Quadriceps Tendon: Nontender &  Intact Patella Tendon: Nontender &  Intact Medial Distal Hamstring/PES: Nontender Lateral Distal Hamstring: Nontender & Stable Iliotibial Band: Nontender Medial Patellofemoral Ligament: Nontender Adductor: Nontender Proximal GSC-Plantaris: Nontender Calf: Supple & Nontender  Inspection: Deformity: No Erythema: No Ecchymosis: No Abrasions: No Effusion: Moderate Prepatellar Bursitis: No  Neurologic Exam: Sensation L4-S1: Grossly Intact  Motor Exam: Quadriceps: 5 out of 5 Hamstrings: 5 out of 5 EHL: 5 out of 5 FHL: 5 out of 5 TA: 5 out of 5 GS: 5 out of 5  Circulatory/Pulses: Dorsalis Pedis: 2+ Posterior Tibialis: 2+  Additional Pertinent Findings: None  Contralateral Knee:               	 ROM: 0-130 degrees  Other Pertinent Findings: None   The patient is a well appearing 38 year year old male of their stated age. Neck is supple & nontender to palpation. Negative Spurling's test.   General: in no acute distress, seated comfortably, moving easily Skin: No discoloration, rashes; on palpation skin is dry, Neuro: Normal sensation all dermatomes, motor all myotomes Vascular: Normal pulses, no edema, normal temperature Coordination and balance: Normal Psych: normal mood and affect, non pressured speech, alert and oriented x3   RIGHT Shoulder Inspection: Scapula Winging: Negative Deformity: None Erythema: None Ecchymosis: None Abrasions: None Effusion: None   Range of Motion: Active Forward Flexion: 160 degrees Passive Forward Flexion: 170 degrees Active IR : L4 Passive ER : 30 degrees   Motor Exam: Forward Flexion: 4+ out of 5 Flexion Plane of Scapula: 5 out of 5 Abduction: 4+ out of 5 Internal Rotation: 5 out of 5 External Rotation: 4+ out of 5 Distal Motor Strength: 5 out of 5   Stability Testing: Anterior: 1+ Posterior: 1+ Sulcus N: 1+ Sulcus ER: 1+   Provocative Tests: Drop Arm: Negative Dixon/Impingement: Positive Schuyler: Positive X-Arm Adduction: Negative Belly Press: Negative Bear Hug: Negative Lift Off: Negative Apprehension: Negative Relocation: Negative Posterior Load & Shift: Negative   Palpation: AC Joint: Nontender Clavicle: Nontender SC Joint: Nontender Bicepital Groove: Positive Coracoid Process: Nontender Pectoralis Minor Tendon: Nontender Pectoralis Major Tendon: Nontender & palpably intact Latissimus Dorsi: Nontender Proximal Humerus: Positive Scapula Body: Nontender Medial Scapula Boarder: Nontender Scapula Spine: Nontender   Neurologic Exam: Sensation to Light Touch: Axillary: Grossly intact Ulnar: Grossly intact Radial: Grossly intact Median: Grossly intact Other:  N/A   Circulatory/Pulses: Ulnar: 2+ Radial: 2+ Other Pertinent Findings: None   LEFT Shoulder Range of Motion: Active Forward Flexion: 180 degrees Active Abduction: 180 degrees Passive Forward Flexion: 180 degrees Passive Abduction: 180 degrees ER @ 90 degrees: 90 degrees IR @ 90 degrees: 45 degrees ER @ 0 degrees: 50 degrees   Motor Exam: Forward Flexion: 5 out of 5 Flexion Plane of Scapula: 5 out of 5 Abduction: 5 out of 5 Internal Rotation: 5 out of 5 External Rotation: 5 out of 5 Distal Motor Strength: 5 out of 5   Stability Testing: Anterior: 1+ Posterior: 1+ Sulcus N: 1+ Sulcus ER: 1+   Other Pertinent Findings: None

## 2023-08-16 NOTE — HISTORY OF PRESENT ILLNESS
[Intermittent] : intermittent [Stairs] : stairs [Dull/Aching] : dull/aching [de-identified] : LAZARO HIDALGO is a 38 year old male here today complaining of right shoulder and knee pain. onset a few weeks ago, denies injury. denies N/T/radiating pain from R shoulder. c/o clicking in R shoulder and R knee. He is attending PT currently and using knee brace. -hx oh L knee pain resolved with PT  [] : no [FreeTextEntry1] : right shoulder and knee  [FreeTextEntry5] : LAZARO HIDALGO is a 38 year old male here today complaining of right shoulder and knee pain. onset a few weeks ago, denies injury. denies N/T/radiating pain from R shoulder. c/o clicking R shoulder  -hx oh L knee pain resolved with PT  [de-identified] : 07/08/23 [de-identified] : BILL Fair  [de-identified] : xrays

## 2023-08-16 NOTE — DATA REVIEWED
[Shoulder] : shoulder [Outside X-rays] : outside x-rays [Right] : of the right [Knee] : knee [Report was reviewed and noted in the chart] : The report was reviewed and noted in the chart [I independently reviewed and interpreted images and report] : I independently reviewed and interpreted images and report [I reviewed the films/CD and agree] : I reviewed the films/CD and agree [FreeTextEntry1] : no abnormalities [FreeTextEntry2] : patella kenney

## 2023-08-17 ENCOUNTER — APPOINTMENT (OUTPATIENT)
Dept: BARIATRICS/WEIGHT MGMT | Facility: CLINIC | Age: 38
End: 2023-08-17
Payer: COMMERCIAL

## 2023-08-17 ENCOUNTER — APPOINTMENT (OUTPATIENT)
Dept: GASTROENTEROLOGY | Facility: CLINIC | Age: 38
End: 2023-08-17
Payer: COMMERCIAL

## 2023-08-17 VITALS
OXYGEN SATURATION: 97 % | SYSTOLIC BLOOD PRESSURE: 129 MMHG | WEIGHT: 209 LBS | HEART RATE: 104 BPM | TEMPERATURE: 98.1 F | HEIGHT: 62 IN | BODY MASS INDEX: 38.46 KG/M2 | DIASTOLIC BLOOD PRESSURE: 79 MMHG

## 2023-08-17 VITALS
WEIGHT: 207.38 LBS | TEMPERATURE: 98.4 F | BODY MASS INDEX: 38.16 KG/M2 | SYSTOLIC BLOOD PRESSURE: 118 MMHG | DIASTOLIC BLOOD PRESSURE: 67 MMHG | OXYGEN SATURATION: 96 % | HEART RATE: 109 BPM | HEIGHT: 62 IN

## 2023-08-17 DIAGNOSIS — R53.83 OTHER FATIGUE: ICD-10-CM

## 2023-08-17 PROCEDURE — G0447 BEHAVIOR COUNSEL OBESITY 15M: CPT | Mod: 59

## 2023-08-17 PROCEDURE — 99205 OFFICE O/P NEW HI 60 MIN: CPT | Mod: 25

## 2023-08-17 PROCEDURE — 99203 OFFICE O/P NEW LOW 30 MIN: CPT

## 2023-08-17 NOTE — PHYSICAL EXAM
[Alert] : alert [Normal Voice/Communication] : normal voice/communication [Healthy Appearing] : healthy appearing [No Acute Distress] : no acute distress [Sclera] : the sclera and conjunctiva were normal [Hearing Threshold Finger Rub Not [place]] : hearing was normal [Normal Lips/Gums] : the lips and gums were normal [Oropharynx] : the oropharynx was normal [Normal Appearance] : the appearance of the neck was normal [No Neck Mass] : no neck mass was observed [No Respiratory Distress] : no respiratory distress [No Acc Muscle Use] : no accessory muscle use [Respiration, Rhythm And Depth] : normal respiratory rhythm and effort [Auscultation Breath Sounds / Voice Sounds] : lungs were clear to auscultation bilaterally [Heart Rate And Rhythm] : heart rate was normal and rhythm regular [Normal S1, S2] : normal S1 and S2 [Murmurs] : no murmurs [Bowel Sounds] : normal bowel sounds [Abdomen Tenderness] : non-tender [No Masses] : no abdominal mass palpated [Abdomen Soft] : soft [] : no hepatosplenomegaly [Oriented To Time, Place, And Person] : oriented to person, place, and time

## 2023-08-17 NOTE — ASSESSMENT
[FreeTextEntry1] : This is a 38 year old male here for an evaluation of reflux.   Less likely his hiatal hernia is the issue. More dietary and not taking PPI the correct way  My plan  C/W PPI but take it 30-45 min before breakfast H-pylori stool test obtained EGD report from Dr. Lemus [phone] f/u in 2 weeks   Total time spent to complete patient's bedside assessment, physical examination, review medical chart including labs & imaging, discuss medical plan of care with patient was more than 30 minutes.

## 2023-08-17 NOTE — HISTORY OF PRESENT ILLNESS
[FreeTextEntry1] : This is a 38 year old male here for an evaluation of reflux. PMH of hiatal hernia, epilepsy, anxiety, obesity, asthma. Denies a family history of colon CA, gastric CA, high risk polyps, Inflammatory and autoimmune disease. Patient denies any difficulty swallowing. Has intermittent reflux and takes Omeprazole 40mg with meals with partial help. The reflux is random. Has intermittent bloating. Denies any N&V or abdominal pain. Denies any blood or dark tarry stools. Normal caliber. Trying to lose weight. EGD from 3 years ago with ? hiatal hernia.  Smoke/Etoh: none

## 2023-08-18 ENCOUNTER — TRANSCRIPTION ENCOUNTER (OUTPATIENT)
Age: 38
End: 2023-08-18

## 2023-08-21 ENCOUNTER — TRANSCRIPTION ENCOUNTER (OUTPATIENT)
Age: 38
End: 2023-08-21

## 2023-08-23 NOTE — REVIEW OF SYSTEMS
[Patient Intake Form Reviewed] : Patient intake form was reviewed [Negative] : Heme/Lymph [MED-ROS-Cons-FT] : fatigue [MED-ROS-Resp-FT] : asthma [MED-ROS-Allmmun-] : thony gutierrez [MED-ROS-Gastro-FT] : GERD from hiatal hernia [MED-ROS-Musclo-FT] : R knee pain,  [MED-ROS-Integum-FT] : acne [MED-ROS-Neuro-FT] : epilepsy [MED-ROS-Psych-FT] : anxiety/depression, panic disorder w/o agorophobia

## 2023-08-23 NOTE — ASSESSMENT
[FreeTextEntry1] : Patient with BMI of 37.93 kg/m2. We discussed the importance of weight loss in the management of his comorbidities. We discussed the risks of continued excess weight on long term health.  We discussed at length the importance of following a carbohydrate balanced diet and the importance of incorporating protein in meals. We also discussed appropriate alternative food choices. I have recommended he f/u with the RD for additional education. We discussed ways he can incorporate exercise into his daily routine. At least 15 minutes was spent during the visit on obesity counseling.   Spent time discussing how his lifestyle choices are impacting his inability to lose weight. Discussed the importance of making lifestyle modifications. Will complete labs to r/o secondary causes of weight gain. Patient prefers to work on weight loss as naturally as possible. Pending labs will discuss and see if any medications are needed/appropriate. Also discussed using CBT/ACT workbooks in addition to therapy.   Emotional support provided. All of his questions were answered.   Patient to complete labs, will call with the results.  f/u in 3 months

## 2023-08-23 NOTE — HISTORY OF PRESENT ILLNESS
[Improved Health] : Improved health [Young Adult] : yound adult [Cut/Track Calories] : cut/track calories [Low Carb Diet (Atkins/Keto)] : low carb diet (Atkins/Keto) [Work stress] : work stress [Change in activity level] : change in activity level [Other: ____] : [unfilled] [10] : 10 [0-1 nights per week] : I use a CPAP machine 0-1 nights per week [I usually sleep 4-6 hours] : I usually sleep 4-6 hours [2 blocks] : Walking distance capability:2 blocks [0] : 0 [None] : none [FreeTextEntry2] : 197 [FreeTextEntry3] : 212 [] : No [FreeTextEntry1] : 38 year old male with asthma, epilepsy, hiatal hernia, and depression/amxiety for evaluation of weight gain. He reports that he started to struggle with his weight since injuring his knee several months ago. He reports that he tried a Keto diet in the past but was not successful at weight loss. Currently he feels too tired to meal prep or cook food and most eats food out. He has been sedentary and has not been walking due to his knee pain. He is going to PT once a week. He reports that he has severe fatigue where he comes home from work and gets into bed to take a nap, then has dinner and goes back to bed. He does have MARGIE and has a CPAP but reports that the mask does not fit and he has not been using it. He does have significant depression/anxiety and works with a therapist (does CBT) but does not want to take any medication for it. He does have a h/o epilepsy.   24 hr intake: B: corn muffin or bellvita cookie or tuerkey egg cheese on english muffin (muffin) L: turkey lettuce and andrade on ww bread snack: cheese stick D: WW chicken quesadilla, chinese food ( chicken lo main), mac n cheese snacks: frozen yogurt, strawberry sugar free pop, chocolate crackers. pb crackers drinks: water  dr migue schwartz [phone] (neurologist)

## 2023-08-29 ENCOUNTER — APPOINTMENT (OUTPATIENT)
Dept: ORTHOPEDIC SURGERY | Facility: CLINIC | Age: 38
End: 2023-08-29
Payer: COMMERCIAL

## 2023-08-29 VITALS — BODY MASS INDEX: 38.09 KG/M2 | WEIGHT: 207 LBS | HEIGHT: 62 IN

## 2023-08-29 DIAGNOSIS — S39.012A STRAIN OF MUSCLE, FASCIA AND TENDON OF LOWER BACK, INITIAL ENCOUNTER: ICD-10-CM

## 2023-08-29 PROCEDURE — 99214 OFFICE O/P EST MOD 30 MIN: CPT

## 2023-08-29 PROCEDURE — 72110 X-RAY EXAM L-2 SPINE 4/>VWS: CPT

## 2023-08-29 PROCEDURE — 72170 X-RAY EXAM OF PELVIS: CPT

## 2023-08-30 NOTE — IMAGING
[No bony abnormalities] : No bony abnormalities [No spinal deformity, fracture, lytic lesion, or marked single level collapse] : No spinal deformity, fracture, lytic lesion, or marked single level collapse [No instability seen on flexion/extension] : No instability seen on flexion/extension [de-identified] : LSPINE Inspection: no defects, deformity Palpation: No tenderness or spasm in bilateral and lumbar paraspinal musculature ROM: diminished all planes Motor: no focal deficit  Strength: 5/5 bilateral hip flexors, knee extensors, ankle dorsiflexors, EHL, ankle plantarflexors Sensation I LT  - SLR B/L  Toe and heal walking intact  Gait mildly antalgic, ambulating with a cane.

## 2023-08-30 NOTE — HISTORY OF PRESENT ILLNESS
[Lower back] : lower back [10] : 10 [8] : 8 [Sharp] : sharp [Throbbing] : throbbing [Tightness] : tightness [Full time] : Work status: full time [de-identified] : 08/29/2023: This is a 38 year M with c/o of lower back pain. Started 2 days ago, after carrying heavy luggage. Localized in midline. Occasionally radiating into R thigh. Currently doing PT for R knee and shoulder. Denies N/T. No bb dysfunction. Ambulating with a cane. Tylenol helping mildly with pain.  [FreeTextEntry5] : no injury [de-identified] : cane, advil, tylenol

## 2023-08-30 NOTE — ASSESSMENT
[FreeTextEntry1] : Acute LBP w/o LE radic after heavy lifting 1 day ago. LS xrays w/o fx, instability or deformity. Start PT to strengthen core, c/w PT for knee and shoulder. Suggested using brace on and off. Prescribed muscle relaxers. F/up if/as symptoms dictate.

## 2023-09-02 ENCOUNTER — NON-APPOINTMENT (OUTPATIENT)
Age: 38
End: 2023-09-02

## 2023-09-06 ENCOUNTER — LABORATORY RESULT (OUTPATIENT)
Age: 38
End: 2023-09-06

## 2023-09-06 ENCOUNTER — APPOINTMENT (OUTPATIENT)
Dept: PULMONOLOGY | Facility: CLINIC | Age: 38
End: 2023-09-06
Payer: COMMERCIAL

## 2023-09-06 VITALS
SYSTOLIC BLOOD PRESSURE: 133 MMHG | HEART RATE: 98 BPM | OXYGEN SATURATION: 96 % | HEIGHT: 62 IN | BODY MASS INDEX: 37.36 KG/M2 | DIASTOLIC BLOOD PRESSURE: 81 MMHG | TEMPERATURE: 98 F | WEIGHT: 203 LBS

## 2023-09-06 DIAGNOSIS — J30.2 OTHER SEASONAL ALLERGIC RHINITIS: ICD-10-CM

## 2023-09-06 DIAGNOSIS — R05.8 OTHER SPECIFIED COUGH: ICD-10-CM

## 2023-09-06 PROCEDURE — 99214 OFFICE O/P EST MOD 30 MIN: CPT

## 2023-09-06 NOTE — PHYSICAL EXAM
[No Acute Distress] : no acute distress [Normal Oropharynx] : normal oropharynx [II] : Mallampati Class: II [Normal Appearance] : normal appearance [No Neck Mass] : no neck mass [Normal Rate/Rhythm] : normal rate/rhythm [Normal S1, S2] : normal s1, s2 [No Resp Distress] : no resp distress [Clear to Auscultation Bilaterally] : clear to auscultation bilaterally [No Abnormalities] : no abnormalities [Normal Gait] : normal gait [No Clubbing] : no clubbing [No Edema] : no edema [Normal Color/ Pigmentation] : normal color/ pigmentation [No Focal Deficits] : no focal deficits [Oriented x3] : oriented x3 [Normal Affect] : normal affect

## 2023-09-06 NOTE — REVIEW OF SYSTEMS
[Postnasal Drip] : postnasal drip [Seasonal Allergies] : seasonal allergies [GERD] : gerd [Headache] : headache [Obesity] : obesity [Negative] : Psychiatric

## 2023-09-06 NOTE — HISTORY OF PRESENT ILLNESS
[Never] : never [Obstructive Sleep Apnea] : obstructive sleep apnea [Awakes with Dry Mouth] : awakes with dry mouth [Awakes with Headache] : awakes with headache [Daytime Somnolence] : daytime somnolence [Difficulty Maintaining Sleep] : difficulty maintaining sleep [Frequent Nocturnal Awakening] : frequent nocturnal awakening [Recent  Weight Gain] : recent weight gain [Snoring] : snoring [Witnessed Apneas] : witnessed apneas [Lab] : lab [TextBox_4] : ~age~yo man never smoker w/ asthma (dx in childhood), GERD w/ hiatal hernia, obesity, referred by PMD to establish pulmonary care for asthma, with recent exacerbation about 2 weeks ago.   Began feeling ill 7/17 - coughing with yellow sputa, wheezing, chest tightness and DOMINGUEZ. No measured fevers or chills or other B-symptoms. ROS +congestion, rhinorrhea and PND. Went to  7/19, was given symbicort, mucinex and singulair but no steroids. Did not feel better. Albuterol inhaler only temporized asthma sx. Saw PMD 7/21 and given medrol dose gaurav with marked improvement in asthma symptoms. Today c/o bothersome cough that is still intermittently productive but now clear mucus if any. Cough is source of distress as it affects him throughout day and interferes with sleep. Been using symbicort PRN and albuterol PRN. No longer wheezing and overall improving. Last exacerbation prior to this was Dec 2022 when he got COVID-19 (fully vaccinated + boosters). Asthma was generally controlled with PRN JAQUAN most of his life but this year he says has been awful and attributes the poor air quality and pollution this season to his diminished overall health. Has not had a PFT in years. Has gained weight as he has not been exercising much this season and is anxious about seeing GI MD to address his GERD and hiatal hernia.   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 Doing better since last visit. Post-viral cough and body pain resolved, less airborne pollutants lately too so asthma feels more controlled. Recently returned from trip to [place]. Would like to get referral for re-initiation of CPAP for MARGIE. Hasn't had PSG in many years and his CPAP is not in good working order. Paid out of pocket for most of the supplies at the time. Feels symptomatic of MARGIE with daytime somnolence, loud snoring, AM headache, AM dry mouth, and poor sleep maintenance as primary sx.  [TextBox_77] : 7027 [TextBox_79] : 8852 [TextBox_81] : 50-58 [TextBox_83] : 1-2 [TextBox_85] : 7-8hr [TextBox_89] : 1-2 [TextBox_100] : 01/2016 [TextBox_108] : unk [TextBox_112] : unk [TextBox_116] : unk [TextBox_11] : 6 [ESS] : 9

## 2023-09-06 NOTE — ASSESSMENT
[FreeTextEntry1] : ~age~yo man never smoker w/ asthma (dx in childhood), GERD w/ hiatal hernia, obesity, MARGIE, referred by PMD for asthma mgmt.   Data Reviewed: PFT (8/16/23) - normal FEV1 and ratio with significant BD response, normal volumes and DLCO Medication reconciliation No collateral available regarding prior PSG or workup   Impression: #Asthma, last exacerbation mid-July 2023 improved #Post-viral cough syndrome, now resolved #MARGIE #Seasonal allergies  ESS=9, STOP-BANG=6 off treatment  Plan: - cont albuterol MDI q4-6h PRN wheezing/dyspnea or chest tightness - has resumed Symbicort 80/4.5 "as needed" dosing approach to his asthma, knows to switch to daily use if becoming symptomatic  - suggested trying Xyzal OTC allergy relief as other allergy therapies he tried says aren't effective - check asthma profile, eosinophil count and serum IgE now that he's out of exacerbation - will refer to [facility] for repeat PSG and CPAP titration given many years since last study and significant change in weight; he will also need a new machine  - holding off Singulair d/t adverse effects  RTO after PSG and resumption of CPAP for MARGIE after evaluation at [facility]

## 2023-09-08 LAB
A ALTERNATA IGE QN: <0.1 KUA/L
A FUMIGATUS IGE QN: <0.1 KUA/L
C ALBICANS IGE QN: <0.1 KUA/L
C HERBARUM IGE QN: <0.1 KUA/L
CAT DANDER IGE QN: 10.1 KUA/L
COMMON RAGWEED IGE QN: 1.81 KUA/L
D FARINAE IGE QN: 0.37 KUA/L
D PTERONYSS IGE QN: 0.34 KUA/L
DEPRECATED A ALTERNATA IGE RAST QL: 0
DEPRECATED A FUMIGATUS IGE RAST QL: 0
DEPRECATED C ALBICANS IGE RAST QL: 0
DEPRECATED C HERBARUM IGE RAST QL: 0
DEPRECATED CAT DANDER IGE RAST QL: 3
DEPRECATED COMMON RAGWEED IGE RAST QL: 2
DEPRECATED D FARINAE IGE RAST QL: 1
DEPRECATED D PTERONYSS IGE RAST QL: NORMAL
DEPRECATED DOG DANDER IGE RAST QL: 1
DEPRECATED M RACEMOSUS IGE RAST QL: 0
DEPRECATED ROACH IGE RAST QL: 0
DEPRECATED TIMOTHY IGE RAST QL: 0
DEPRECATED WHITE OAK IGE RAST QL: 1
DOG DANDER IGE QN: 0.66 KUA/L
EOSINOPHIL # BLD MANUAL: 250 /UL
M RACEMOSUS IGE QN: <0.1 KUA/L
ROACH IGE QN: <0.1 KUA/L
TIMOTHY IGE QN: <0.1 KUA/L
TOTAL IGE SMQN RAST: 39 KU/L
WHITE OAK IGE QN: 0.48 KUA/L

## 2023-09-11 ENCOUNTER — TRANSCRIPTION ENCOUNTER (OUTPATIENT)
Age: 38
End: 2023-09-11

## 2023-09-27 ENCOUNTER — APPOINTMENT (OUTPATIENT)
Dept: ORTHOPEDIC SURGERY | Facility: CLINIC | Age: 38
End: 2023-09-27
Payer: COMMERCIAL

## 2023-09-27 DIAGNOSIS — M75.41 IMPINGEMENT SYNDROME OF RIGHT SHOULDER: ICD-10-CM

## 2023-09-27 DIAGNOSIS — M22.2X2 PATELLOFEMORAL DISORDERS, RIGHT KNEE: ICD-10-CM

## 2023-09-27 DIAGNOSIS — M22.2X1 PATELLOFEMORAL DISORDERS, RIGHT KNEE: ICD-10-CM

## 2023-09-27 PROCEDURE — 99214 OFFICE O/P EST MOD 30 MIN: CPT

## 2023-10-02 ENCOUNTER — TRANSCRIPTION ENCOUNTER (OUTPATIENT)
Age: 38
End: 2023-10-02

## 2023-10-03 ENCOUNTER — TRANSCRIPTION ENCOUNTER (OUTPATIENT)
Age: 38
End: 2023-10-03

## 2023-10-04 ENCOUNTER — TRANSCRIPTION ENCOUNTER (OUTPATIENT)
Age: 38
End: 2023-10-04

## 2023-10-09 ENCOUNTER — APPOINTMENT (OUTPATIENT)
Age: 38
End: 2023-10-09

## 2023-10-11 ENCOUNTER — APPOINTMENT (OUTPATIENT)
Dept: MRI IMAGING | Facility: CLINIC | Age: 38
End: 2023-10-11
Payer: COMMERCIAL

## 2023-10-11 PROCEDURE — 73221 MRI JOINT UPR EXTREM W/O DYE: CPT | Mod: RT

## 2023-10-11 PROCEDURE — 73721 MRI JNT OF LWR EXTRE W/O DYE: CPT | Mod: RT

## 2023-10-25 ENCOUNTER — APPOINTMENT (OUTPATIENT)
Dept: ORTHOPEDIC SURGERY | Facility: CLINIC | Age: 38
End: 2023-10-25
Payer: COMMERCIAL

## 2023-10-25 DIAGNOSIS — S43.431A SUPERIOR GLENOID LABRUM LESION OF RIGHT SHOULDER, INITIAL ENCOUNTER: ICD-10-CM

## 2023-10-25 DIAGNOSIS — M22.2X1 PATELLOFEMORAL DISORDERS, RIGHT KNEE: ICD-10-CM

## 2023-10-25 PROCEDURE — 99214 OFFICE O/P EST MOD 30 MIN: CPT

## 2023-11-08 ENCOUNTER — APPOINTMENT (OUTPATIENT)
Dept: PULMONOLOGY | Facility: CLINIC | Age: 38
End: 2023-11-08

## 2023-11-09 ENCOUNTER — TRANSCRIPTION ENCOUNTER (OUTPATIENT)
Age: 38
End: 2023-11-09

## 2023-11-16 ENCOUNTER — APPOINTMENT (OUTPATIENT)
Dept: BARIATRICS | Facility: CLINIC | Age: 38
End: 2023-11-16

## 2023-11-18 ENCOUNTER — NON-APPOINTMENT (OUTPATIENT)
Age: 38
End: 2023-11-18

## 2023-11-27 ENCOUNTER — TRANSCRIPTION ENCOUNTER (OUTPATIENT)
Age: 38
End: 2023-11-27

## 2023-12-08 ENCOUNTER — NON-APPOINTMENT (OUTPATIENT)
Age: 38
End: 2023-12-08

## 2023-12-12 ENCOUNTER — TRANSCRIPTION ENCOUNTER (OUTPATIENT)
Age: 38
End: 2023-12-12

## 2023-12-13 ENCOUNTER — TRANSCRIPTION ENCOUNTER (OUTPATIENT)
Age: 38
End: 2023-12-13

## 2023-12-20 ENCOUNTER — APPOINTMENT (OUTPATIENT)
Dept: PULMONOLOGY | Facility: CLINIC | Age: 38
End: 2023-12-20
Payer: COMMERCIAL

## 2023-12-20 VITALS
OXYGEN SATURATION: 98 % | DIASTOLIC BLOOD PRESSURE: 78 MMHG | TEMPERATURE: 98.8 F | RESPIRATION RATE: 16 BRPM | BODY MASS INDEX: 38.64 KG/M2 | HEART RATE: 98 BPM | WEIGHT: 210 LBS | SYSTOLIC BLOOD PRESSURE: 147 MMHG | HEIGHT: 62 IN

## 2023-12-20 DIAGNOSIS — U07.1 COVID-19: ICD-10-CM

## 2023-12-20 PROCEDURE — 99214 OFFICE O/P EST MOD 30 MIN: CPT

## 2023-12-20 RX ORDER — BUDESONIDE AND FORMOTEROL FUMARATE DIHYDRATE 160; 4.5 UG/1; UG/1
160-4.5 AEROSOL RESPIRATORY (INHALATION) TWICE DAILY
Qty: 30.6 | Refills: 1 | Status: ACTIVE | COMMUNITY
Start: 2023-08-02 | End: 1900-01-01

## 2023-12-20 RX ORDER — METHYLPREDNISOLONE 4 MG/1
4 TABLET ORAL
Qty: 1 | Refills: 0 | Status: DISCONTINUED | COMMUNITY
Start: 2023-07-21 | End: 2023-12-20

## 2023-12-20 NOTE — ASSESSMENT
[FreeTextEntry1] : ~age~yo man never smoker w/ asthma (dx in childhood), GERD w/ hiatal hernia, obesity, MARGIE, referred by PMD and following for mgmt of asthma and MARGIE.   Data Reviewed: Medication reconciliation Asthma profile sig for cat allergy, mild allergy to ragweed, oak, dog, dust mite  Impression: #Asthma, last exacerbation mid-July 2023 improved #COVID-19 #MARGIE  ESS=9, STOP-BANG=6 off treatment  Plan: - cont albuterol MDI q4-6h PRN wheezing/dyspnea or chest tightness - has not used in lieu of PRN symbicort based on JESUS guidelines  - cont PRN Symbicort 160/4.5mcg 2 puffs symptom triggered - rx renewed today - awaiting result of CPAP titration study from sleep center, then will rx PAP to resume treatment for MARGIE - completed paxlovid, encouraged to increase exertional activity to help combat fatigue and potential post-covid sx - holding off Singulair d/t adverse effects  RTO 1-2mo after resuming PAP tx pending titration report from sleep center

## 2023-12-20 NOTE — REVIEW OF SYSTEMS
[Fatigue] : fatigue [Recent Wt Gain (___ Lbs)] : ~T recent [unfilled] lb weight gain [Cough] : cough [Seasonal Allergies] : seasonal allergies [GERD] : gerd [Headache] : headache [Obesity] : obesity [Negative] : Psychiatric [TextBox_30] : improving sx post-COVID

## 2023-12-20 NOTE — HISTORY OF PRESENT ILLNESS
[Never] : never [TextBox_4] : ~age~yo man never smoker w/ asthma (dx in childhood), GERD w/ hiatal hernia, obesity, referred by PMD to establish pulmonary care for asthma, with recent exacerbation about 2 weeks ago.   Began feeling ill 7/17 - coughing with yellow sputa, wheezing, chest tightness and DOMINGUEZ. No measured fevers or chills or other B-symptoms. ROS +congestion, rhinorrhea and PND. Went to  7/19, was given symbicort, mucinex and singulair but no steroids. Did not feel better. Albuterol inhaler only temporized asthma sx. Saw PMD 7/21 and given medrol dose gaurav with marked improvement in asthma symptoms. Today c/o bothersome cough that is still intermittently productive but now clear mucus if any. Cough is source of distress as it affects him throughout day and interferes with sleep. Been using symbicort PRN and albuterol PRN. No longer wheezing and overall improving. Last exacerbation prior to this was Dec 2022 when he got COVID-19 (fully vaccinated + boosters). Asthma was generally controlled with PRN JAUQAN most of his life but this year he says has been awful and attributes the poor air quality and pollution this season to his diminished overall health. Has not had a PFT in years. Has gained weight as he has not been exercising much this season and is anxious about seeing GI MD to address his GERD and hiatal hernia.   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 Doing better since last visit. Post-viral cough and body pain resolved, less airborne pollutants lately too so asthma feels more controlled. Recently returned from trip to [place]. Would like to get referral for re-initiation of CPAP for MARGIE. Hasn't had PSG in many years and his CPAP is not in good working order. Paid out of pocket for most of the supplies at the time. Feels symptomatic of MARGIE with daytime somnolence, loud snoring, AM headache, AM dry mouth, and poor sleep maintenance as primary sx.   [12/20/23] Here for 3 mo f/u. Had COVID a couple weeks ago. Felt terrible. Used Symbicort several times but more as a precaution. DId not need steroids, received paxlovid from urgent care when swab was positive. Main sx were significant fatigue, myalgias, cough, sputa; no wheezing. Doing better today, but concerned about long covid sx and feels foggy. Went for CPAP titration on Friday, was told his apneas were better on  PAP; awaiting results of titration and will be getting machine soon. Has not used or needed albuterol. Rinses after Symbicort.  [Obstructive Sleep Apnea] : obstructive sleep apnea [Awakes with Dry Mouth] : awakes with dry mouth [Awakes with Headache] : awakes with headache [Daytime Somnolence] : daytime somnolence [Difficulty Maintaining Sleep] : difficulty maintaining sleep [Frequent Nocturnal Awakening] : frequent nocturnal awakening [Recent  Weight Gain] : recent weight gain [Snoring] : snoring [Witnessed Apneas] : witnessed apneas [Lab] : lab [TextBox_77] : 3641 [TextBox_79] : 8208 [TextBox_81] : 10-02 [TextBox_83] : 1-2 [TextBox_85] : 7-8hr [TextBox_100] : 01/2016 [TextBox_89] : 1-2 [TextBox_108] : unk [TextBox_112] : unk [TextBox_116] : unk [ESS] : 9 [TextBox_11] : 6

## 2023-12-20 NOTE — DISCUSSION/SUMMARY
[FreeTextEntry1] : PFT (8/16/23) - normal FEV1 and ratio with significant BD response, normal volumes and DLCO

## 2023-12-26 ENCOUNTER — TRANSCRIPTION ENCOUNTER (OUTPATIENT)
Age: 38
End: 2023-12-26

## 2024-01-08 ENCOUNTER — TRANSCRIPTION ENCOUNTER (OUTPATIENT)
Age: 39
End: 2024-01-08

## 2024-01-09 ENCOUNTER — TRANSCRIPTION ENCOUNTER (OUTPATIENT)
Age: 39
End: 2024-01-09

## 2024-03-27 ENCOUNTER — APPOINTMENT (OUTPATIENT)
Dept: FAMILY MEDICINE | Facility: CLINIC | Age: 39
End: 2024-03-27
Payer: COMMERCIAL

## 2024-03-27 PROCEDURE — G2211 COMPLEX E/M VISIT ADD ON: CPT

## 2024-03-27 PROCEDURE — 99214 OFFICE O/P EST MOD 30 MIN: CPT

## 2024-03-29 NOTE — PLAN
[FreeTextEntry1] : Patient instructed to notify office with any new or worsening S&S as educated- patient agreeable with plan. pt needs to come into office for in person eval and labs  pt agreeable with plan

## 2024-03-29 NOTE — HISTORY OF PRESENT ILLNESS
[Home] : at home, [unfilled] , at the time of the visit. [Medical Office: ([facility])___] : at the medical office located in  [Verbal consent obtained from patient] : the patient, [unfilled] [FreeTextEntry1] : follow up  [de-identified] : Mar 27 2024  3:00PM   38 year  old male presents for follow up via TEB  PMH: asthma, anxiety/depression - (states stable )  on temp [employer] - needs paperwork done   hasnt been in office for over a year  states has been hard to get in and travel far since now living in [place]    Allergies: amoxicillin, ceclor, clindamycin, doxycycline, PCN Meds:  topiramate, omeprazole  Supplements:   Social hx/Emotional Health: states doing okay

## 2024-04-01 ENCOUNTER — NON-APPOINTMENT (OUTPATIENT)
Age: 39
End: 2024-04-01

## 2024-04-08 ENCOUNTER — APPOINTMENT (OUTPATIENT)
Dept: FAMILY MEDICINE | Facility: CLINIC | Age: 39
End: 2024-04-08
Payer: COMMERCIAL

## 2024-04-08 VITALS
OXYGEN SATURATION: 97 % | RESPIRATION RATE: 16 BRPM | TEMPERATURE: 98 F | BODY MASS INDEX: 40.67 KG/M2 | SYSTOLIC BLOOD PRESSURE: 129 MMHG | WEIGHT: 221 LBS | DIASTOLIC BLOOD PRESSURE: 84 MMHG | HEIGHT: 62 IN | HEART RATE: 106 BPM

## 2024-04-08 DIAGNOSIS — R10.9 UNSPECIFIED ABDOMINAL PAIN: ICD-10-CM

## 2024-04-08 PROCEDURE — 99213 OFFICE O/P EST LOW 20 MIN: CPT

## 2024-04-08 NOTE — HISTORY OF PRESENT ILLNESS
[FreeTextEntry8] : Patient presents today w/ c/o left flank pain when coughing or sneezing. Denies pain occurring any other time.  Denies N/V/D, constipation, chest pain, SOB, recent injury.

## 2024-04-20 ENCOUNTER — EMERGENCY (EMERGENCY)
Facility: HOSPITAL | Age: 39
LOS: 1 days | Discharge: HOME | End: 2024-04-20
Attending: EMERGENCY MEDICINE
Payer: COMMERCIAL

## 2024-04-20 VITALS
OXYGEN SATURATION: 97 % | HEART RATE: 100 BPM | HEIGHT: 63 IN | DIASTOLIC BLOOD PRESSURE: 93 MMHG | RESPIRATION RATE: 20 BRPM | TEMPERATURE: 98 F | WEIGHT: 220.68 LBS | SYSTOLIC BLOOD PRESSURE: 148 MMHG

## 2024-04-20 LAB
ALBUMIN SERPL ELPH-MCNC: 4.1 G/DL — SIGNIFICANT CHANGE UP (ref 3.5–5)
ALP SERPL-CCNC: 129 U/L — HIGH (ref 40–120)
ALT FLD-CCNC: 45 U/L DA — SIGNIFICANT CHANGE UP (ref 10–60)
ANION GAP SERPL CALC-SCNC: 7 MMOL/L — SIGNIFICANT CHANGE UP (ref 5–17)
APPEARANCE UR: CLEAR — SIGNIFICANT CHANGE UP
AST SERPL-CCNC: 16 U/L — SIGNIFICANT CHANGE UP (ref 10–40)
BASOPHILS # BLD AUTO: 0.09 K/UL — SIGNIFICANT CHANGE UP (ref 0–0.2)
BASOPHILS NFR BLD AUTO: 0.7 % — SIGNIFICANT CHANGE UP (ref 0–2)
BILIRUB SERPL-MCNC: 0.3 MG/DL — SIGNIFICANT CHANGE UP (ref 0.2–1.2)
BILIRUB UR-MCNC: NEGATIVE — SIGNIFICANT CHANGE UP
BUN SERPL-MCNC: 15 MG/DL — SIGNIFICANT CHANGE UP (ref 7–18)
CALCIUM SERPL-MCNC: 8.9 MG/DL — SIGNIFICANT CHANGE UP (ref 8.4–10.5)
CHLORIDE SERPL-SCNC: 112 MMOL/L — HIGH (ref 96–108)
CO2 SERPL-SCNC: 23 MMOL/L — SIGNIFICANT CHANGE UP (ref 22–31)
COLOR SPEC: YELLOW — SIGNIFICANT CHANGE UP
CREAT SERPL-MCNC: 0.83 MG/DL — SIGNIFICANT CHANGE UP (ref 0.5–1.3)
DIFF PNL FLD: ABNORMAL
EGFR: 116 ML/MIN/1.73M2 — SIGNIFICANT CHANGE UP
EOSINOPHIL # BLD AUTO: 0.33 K/UL — SIGNIFICANT CHANGE UP (ref 0–0.5)
EOSINOPHIL NFR BLD AUTO: 2.5 % — SIGNIFICANT CHANGE UP (ref 0–6)
GLUCOSE SERPL-MCNC: 116 MG/DL — HIGH (ref 70–99)
GLUCOSE UR QL: NEGATIVE MG/DL — SIGNIFICANT CHANGE UP
HCT VFR BLD CALC: 47 % — SIGNIFICANT CHANGE UP (ref 39–50)
HGB BLD-MCNC: 15.4 G/DL — SIGNIFICANT CHANGE UP (ref 13–17)
IMM GRANULOCYTES NFR BLD AUTO: 0.4 % — SIGNIFICANT CHANGE UP (ref 0–0.9)
KETONES UR-MCNC: NEGATIVE MG/DL — SIGNIFICANT CHANGE UP
LEUKOCYTE ESTERASE UR-ACNC: NEGATIVE — SIGNIFICANT CHANGE UP
LIDOCAIN IGE QN: 38 U/L — SIGNIFICANT CHANGE UP (ref 13–75)
LYMPHOCYTES # BLD AUTO: 33.7 % — SIGNIFICANT CHANGE UP (ref 13–44)
LYMPHOCYTES # BLD AUTO: 4.48 K/UL — HIGH (ref 1–3.3)
MAGNESIUM SERPL-MCNC: 2 MG/DL — SIGNIFICANT CHANGE UP (ref 1.6–2.6)
MCHC RBC-ENTMCNC: 26 PG — LOW (ref 27–34)
MCHC RBC-ENTMCNC: 32.8 GM/DL — SIGNIFICANT CHANGE UP (ref 32–36)
MCV RBC AUTO: 79.4 FL — LOW (ref 80–100)
MONOCYTES # BLD AUTO: 1.15 K/UL — HIGH (ref 0–0.9)
MONOCYTES NFR BLD AUTO: 8.6 % — SIGNIFICANT CHANGE UP (ref 2–14)
NEUTROPHILS # BLD AUTO: 7.2 K/UL — SIGNIFICANT CHANGE UP (ref 1.8–7.4)
NEUTROPHILS NFR BLD AUTO: 54.1 % — SIGNIFICANT CHANGE UP (ref 43–77)
NITRITE UR-MCNC: NEGATIVE — SIGNIFICANT CHANGE UP
NRBC # BLD: 0 /100 WBCS — SIGNIFICANT CHANGE UP (ref 0–0)
PH UR: 5.5 — SIGNIFICANT CHANGE UP (ref 5–8)
PLATELET # BLD AUTO: 304 K/UL — SIGNIFICANT CHANGE UP (ref 150–400)
POTASSIUM SERPL-MCNC: 3.8 MMOL/L — SIGNIFICANT CHANGE UP (ref 3.5–5.3)
POTASSIUM SERPL-SCNC: 3.8 MMOL/L — SIGNIFICANT CHANGE UP (ref 3.5–5.3)
PROT SERPL-MCNC: 8 G/DL — SIGNIFICANT CHANGE UP (ref 6–8.3)
PROT UR-MCNC: NEGATIVE MG/DL — SIGNIFICANT CHANGE UP
RBC # BLD: 5.92 M/UL — HIGH (ref 4.2–5.8)
RBC # FLD: 13.3 % — SIGNIFICANT CHANGE UP (ref 10.3–14.5)
SODIUM SERPL-SCNC: 142 MMOL/L — SIGNIFICANT CHANGE UP (ref 135–145)
SP GR SPEC: 1.02 — SIGNIFICANT CHANGE UP (ref 1–1.03)
UROBILINOGEN FLD QL: 0.2 MG/DL — SIGNIFICANT CHANGE UP (ref 0.2–1)
WBC # BLD: 13.3 K/UL — HIGH (ref 3.8–10.5)
WBC # FLD AUTO: 13.3 K/UL — HIGH (ref 3.8–10.5)

## 2024-04-20 PROCEDURE — 36415 COLL VENOUS BLD VENIPUNCTURE: CPT

## 2024-04-20 PROCEDURE — 99284 EMERGENCY DEPT VISIT MOD MDM: CPT

## 2024-04-20 PROCEDURE — 99284 EMERGENCY DEPT VISIT MOD MDM: CPT | Mod: 25

## 2024-04-20 PROCEDURE — 74176 CT ABD & PELVIS W/O CONTRAST: CPT | Mod: 26,MC

## 2024-04-20 PROCEDURE — 96374 THER/PROPH/DIAG INJ IV PUSH: CPT

## 2024-04-20 PROCEDURE — 80053 COMPREHEN METABOLIC PANEL: CPT

## 2024-04-20 PROCEDURE — 76870 US EXAM SCROTUM: CPT | Mod: 26

## 2024-04-20 PROCEDURE — 87491 CHLMYD TRACH DNA AMP PROBE: CPT

## 2024-04-20 PROCEDURE — 83735 ASSAY OF MAGNESIUM: CPT

## 2024-04-20 PROCEDURE — 87591 N.GONORRHOEAE DNA AMP PROB: CPT

## 2024-04-20 PROCEDURE — 85025 COMPLETE CBC W/AUTO DIFF WBC: CPT

## 2024-04-20 PROCEDURE — 74176 CT ABD & PELVIS W/O CONTRAST: CPT | Mod: MC

## 2024-04-20 PROCEDURE — 81001 URINALYSIS AUTO W/SCOPE: CPT

## 2024-04-20 PROCEDURE — 76870 US EXAM SCROTUM: CPT

## 2024-04-20 PROCEDURE — 83690 ASSAY OF LIPASE: CPT

## 2024-04-20 RX ORDER — KETOROLAC TROMETHAMINE 30 MG/ML
15 SYRINGE (ML) INJECTION ONCE
Refills: 0 | Status: DISCONTINUED | OUTPATIENT
Start: 2024-04-20 | End: 2024-04-20

## 2024-04-20 RX ORDER — SODIUM CHLORIDE 9 MG/ML
1000 INJECTION INTRAMUSCULAR; INTRAVENOUS; SUBCUTANEOUS ONCE
Refills: 0 | Status: COMPLETED | OUTPATIENT
Start: 2024-04-20 | End: 2024-04-20

## 2024-04-20 RX ADMIN — SODIUM CHLORIDE 1000 MILLILITER(S): 9 INJECTION INTRAMUSCULAR; INTRAVENOUS; SUBCUTANEOUS at 17:30

## 2024-04-20 RX ADMIN — Medication 15 MILLIGRAM(S): at 17:30

## 2024-04-20 NOTE — ED PROVIDER NOTE - PHYSICAL EXAMINATION
Bilateral testes no swelling, mild tenderness to palpation generalized, scrotum within normal limits, penis within normal limits

## 2024-04-20 NOTE — ED ADULT NURSE NOTE - NSFALLUNIVINTERV_ED_ALL_ED
Bed/Stretcher in lowest position, wheels locked, appropriate side rails in place/Call bell, personal items and telephone in reach/Instruct patient to call for assistance before getting out of bed/chair/stretcher/Non-slip footwear applied when patient is off stretcher/[place] to call system/Physically safe environment - no spills, clutter or unnecessary equipment/Purposeful proactive rounding/Room/bathroom lighting operational, light cord in reach

## 2024-04-20 NOTE — ED ADULT NURSE NOTE - CAS ELECT INFOMATION PROVIDED
pt discharged home with complete dc instruction given to pt  pt able to verbalized dc instruction well  ,  for ff up with PMD ./DC instructions

## 2024-04-20 NOTE — ED PROVIDER NOTE - PROGRESS NOTE DETAILS
Labs are grossly unremarkable.  Urinalysis is unremarkable.  Ultrasound testes within normal limits.  CT abdomen pelvis no acute abnormality.  Will discharge.  Follow-up with urology and primary care doctor.  Return precautions discussed.

## 2024-04-20 NOTE — ED PROVIDER NOTE - OBJECTIVE STATEMENT
38-year-old male denies past medical history presents with bilateral testicular pain and episode of hematuria.  Patient states initially noticed hematuria and was evaluated at urgent care where they did an STI screen which was negative and a urinalysis which showed blood but no UTI.  Patient states since then has been getting a lot of pain/pressure in bilateral testicles as well as some lower back pain.  Patient states never had symptoms like this before.  Denies risk for having an STI.  Denies all other complaints at this time.  There is a family history of kidney stones so concerned she could possibly have a kidney stone.

## 2024-04-20 NOTE — ED PROVIDER NOTE - PATIENT PORTAL LINK FT
OBESITY You can access the FollowMyHealth Patient Portal offered by [facility] by registering at the following website: [email or web address]. By joining [facility]’s FollowMyHealth portal, you will also be able to view your health information using other applications (apps) compatible with our system.

## 2024-04-20 NOTE — ED PROVIDER NOTE - CLINICAL SUMMARY MEDICAL DECISION MAKING FREE TEXT BOX
38-year-old male with hematuria and testicular pain.  PE as above.    Labs, UA, CT abdomen and pelvis stone study, ultrasound testes, Toradol, reassess

## 2024-04-21 ENCOUNTER — TRANSCRIPTION ENCOUNTER (OUTPATIENT)
Age: 39
End: 2024-04-21

## 2024-04-22 ENCOUNTER — APPOINTMENT (OUTPATIENT)
Dept: FAMILY MEDICINE | Facility: CLINIC | Age: 39
End: 2024-04-22
Payer: COMMERCIAL

## 2024-04-22 VITALS
DIASTOLIC BLOOD PRESSURE: 82 MMHG | HEIGHT: 62 IN | RESPIRATION RATE: 17 BRPM | TEMPERATURE: 98.5 F | HEART RATE: 93 BPM | WEIGHT: 220 LBS | OXYGEN SATURATION: 97 % | BODY MASS INDEX: 40.48 KG/M2 | SYSTOLIC BLOOD PRESSURE: 120 MMHG

## 2024-04-22 DIAGNOSIS — M94.0 CHONDROCOSTAL JUNCTION SYNDROME [TIETZE]: ICD-10-CM

## 2024-04-22 LAB
C TRACH RRNA SPEC QL NAA+PROBE: SIGNIFICANT CHANGE UP
N GONORRHOEA RRNA SPEC QL NAA+PROBE: SIGNIFICANT CHANGE UP
SPECIMEN SOURCE: SIGNIFICANT CHANGE UP

## 2024-04-22 PROCEDURE — 36415 COLL VENOUS BLD VENIPUNCTURE: CPT

## 2024-04-22 PROCEDURE — 99214 OFFICE O/P EST MOD 30 MIN: CPT | Mod: 25

## 2024-04-22 RX ORDER — IBUPROFEN 400 MG/1
400 TABLET, FILM COATED ORAL
Qty: 60 | Refills: 0 | Status: DISCONTINUED | COMMUNITY
Start: 2023-07-08 | End: 2024-04-22

## 2024-04-22 RX ORDER — HYDROCORTISONE 25 MG/G
2.5 CREAM TOPICAL
Qty: 1 | Refills: 0 | Status: DISCONTINUED | COMMUNITY
Start: 2022-06-10 | End: 2024-04-22

## 2024-04-22 RX ORDER — OMEPRAZOLE 40 MG/1
40 CAPSULE, DELAYED RELEASE ORAL
Qty: 90 | Refills: 0 | Status: ACTIVE | COMMUNITY
Start: 2023-08-17 | End: 1900-01-01

## 2024-04-22 RX ORDER — TIZANIDINE 4 MG/1
4 TABLET ORAL TWICE DAILY
Qty: 14 | Refills: 0 | Status: DISCONTINUED | COMMUNITY
Start: 2023-08-29 | End: 2024-04-22

## 2024-04-22 RX ORDER — CHLORHEXIDINE GLUCONATE 4 %
1000 LIQUID (ML) TOPICAL DAILY
Qty: 30 | Refills: 3 | Status: DISCONTINUED | COMMUNITY
Start: 2022-11-19 | End: 2024-04-22

## 2024-04-22 RX ORDER — DICLOFENAC SODIUM 75 MG/1
75 TABLET, DELAYED RELEASE ORAL TWICE DAILY
Qty: 30 | Refills: 0 | Status: DISCONTINUED | COMMUNITY
Start: 2023-08-16 | End: 2024-04-22

## 2024-04-22 NOTE — PHYSICAL EXAM
[No Acute Distress] : no acute distress [Normal Oropharynx] : the oropharynx was normal [No Lymphadenopathy] : no lymphadenopathy [No Edema] : there was no peripheral edema [No Extremity Clubbing/Cyanosis] : no extremity clubbing/cyanosis [Normal] : affect was normal and insight and judgment were intact [de-identified] : left inferior rib tenderness to palpation

## 2024-04-22 NOTE — HISTORY OF PRESENT ILLNESS
[FreeTextEntry8] : Went to  earlier in the week for blood in the urine. Went on Monday. No UTI on culture.  Had burning when urination.    Pain in testicular region Saturday.  Had CT Scan and US at ER-everything was okay.  Thought could have passed kidney stone.  UA in ER was okay.   Has been hydrating a lot. Frequency-but no pain.   Also with left sided rib pain.  Has had a cough-seeing Pulm this week, ribs on left side have been hurting him when coughing. Has been off of omeprazole-ran out-so reflux has been acting up. Has not seen GI recently-wanted him to do stool testing. He notes he didn't think it was sanitary and does not want to do at home.  On Topiramate from Neurology  Interested in weight loss medication, notes he met with weight management-said they had discussed surgery-he was scared about it.

## 2024-04-22 NOTE — REVIEW OF SYSTEMS
[Heartburn] : heartburn [Frequency] : frequency [Fever] : no fever [Chills] : no chills [Nasal Discharge] : no nasal discharge [Sore Throat] : no sore throat [Chest Pain] : no chest pain [Palpitations] : no palpitations [Abdominal Pain] : no abdominal pain [Diarrhea] : no diarrhea [Dysuria] : no dysuria [Hematuria] : no hematuria [Headache] : no headache [Dizziness] : no dizziness [FreeTextEntry6] : has not been using CPAP

## 2024-04-22 NOTE — PLAN
[FreeTextEntry1] : Labs from ER visit reviewed-elevated WBC, repeat today. Urinary frequency-blood on UA at -labs reviewed on patient phone.  Culture negative. Recheck urine today-has been hydrating. Family hx of stones. Urology referral placed.   Costochondritis-no injury-start ibuprofen regularly for 1 week with meals to calm inflammation. Discussed GI precautions with NSAID use.  Advised taking with food and to avoid overuse.  Discussed risk of stomach irritation, ulcers and gastric bleeding.  Patient expressed understanding.  GERD-restart Omeprazole 40mg-can be contributing to cough.  Discussed GI follow-up.   Will return for follow-up for fasting lab work/weight loss.  Not working with nutritionist currently.  Not exercising currently.   Will adjust meds based on labs.  Patient expressed understanding of plan.

## 2024-04-24 ENCOUNTER — APPOINTMENT (OUTPATIENT)
Dept: PULMONOLOGY | Facility: CLINIC | Age: 39
End: 2024-04-24
Payer: COMMERCIAL

## 2024-04-24 VITALS
SYSTOLIC BLOOD PRESSURE: 137 MMHG | RESPIRATION RATE: 16 BRPM | BODY MASS INDEX: 40.48 KG/M2 | OXYGEN SATURATION: 96 % | TEMPERATURE: 98 F | HEIGHT: 62 IN | HEART RATE: 115 BPM | WEIGHT: 220 LBS | DIASTOLIC BLOOD PRESSURE: 84 MMHG

## 2024-04-24 DIAGNOSIS — R09.82 POSTNASAL DRIP: ICD-10-CM

## 2024-04-24 LAB
ALBUMIN SERPL ELPH-MCNC: 4.7 G/DL
ALP BLD-CCNC: 127 U/L
ALT SERPL-CCNC: 39 U/L
ANION GAP SERPL CALC-SCNC: 16 MMOL/L
APPEARANCE: CLEAR
AST SERPL-CCNC: 24 U/L
BASOPHILS # BLD AUTO: 0.07 K/UL
BASOPHILS NFR BLD AUTO: 0.6 %
BILIRUB SERPL-MCNC: 0.2 MG/DL
BILIRUBIN URINE: NEGATIVE
BLOOD URINE: NEGATIVE
BUN SERPL-MCNC: 18 MG/DL
CALCIUM SERPL-MCNC: 9.6 MG/DL
CHLORIDE SERPL-SCNC: 103 MMOL/L
CO2 SERPL-SCNC: 22 MMOL/L
COLOR: YELLOW
CREAT SERPL-MCNC: 0.96 MG/DL
EGFR: 105 ML/MIN/1.73M2
EOSINOPHIL # BLD AUTO: 0.3 K/UL
EOSINOPHIL NFR BLD AUTO: 2.6 %
GLUCOSE QUALITATIVE U: NEGATIVE MG/DL
GLUCOSE SERPL-MCNC: 62 MG/DL
HCT VFR BLD CALC: 43.2 %
HGB BLD-MCNC: 14.4 G/DL
IMM GRANULOCYTES NFR BLD AUTO: 0.3 %
KETONES URINE: NEGATIVE MG/DL
LEUKOCYTE ESTERASE URINE: NEGATIVE
LYMPHOCYTES # BLD AUTO: 4.01 K/UL
LYMPHOCYTES NFR BLD AUTO: 34.5 %
MAN DIFF?: NORMAL
MCHC RBC-ENTMCNC: 26.6 PG
MCHC RBC-ENTMCNC: 33.3 GM/DL
MCV RBC AUTO: 79.7 FL
MONOCYTES # BLD AUTO: 0.78 K/UL
MONOCYTES NFR BLD AUTO: 6.7 %
NEUTROPHILS # BLD AUTO: 6.41 K/UL
NEUTROPHILS NFR BLD AUTO: 55.3 %
NITRITE URINE: NEGATIVE
PH URINE: 6.5
PLATELET # BLD AUTO: 275 K/UL
POTASSIUM SERPL-SCNC: 4.5 MMOL/L
PROT SERPL-MCNC: 7.4 G/DL
PROTEIN URINE: NEGATIVE MG/DL
RBC # BLD: 5.42 M/UL
RBC # FLD: 14.3 %
SODIUM SERPL-SCNC: 141 MMOL/L
SPECIFIC GRAVITY URINE: 1.02
UROBILINOGEN URINE: 1 MG/DL
WBC # FLD AUTO: 11.61 K/UL

## 2024-04-24 PROCEDURE — 99214 OFFICE O/P EST MOD 30 MIN: CPT

## 2024-04-24 RX ORDER — TOPIRAMATE 200 MG/1
200 TABLET ORAL
Refills: 0 | Status: ACTIVE | COMMUNITY
Start: 2018-07-30

## 2024-04-24 RX ORDER — FLUTICASONE PROPIONATE 50 UG/1
50 SPRAY, METERED NASAL
Qty: 48 | Refills: 1 | Status: ACTIVE | COMMUNITY
Start: 2024-04-24 | End: 1900-01-01

## 2024-04-24 RX ORDER — AZELASTINE HYDROCHLORIDE 137 UG/1
0.1 SPRAY, METERED NASAL TWICE DAILY
Qty: 90 | Refills: 1 | Status: ACTIVE | COMMUNITY
Start: 2024-04-24 | End: 1900-01-01

## 2024-04-24 NOTE — REVIEW OF SYSTEMS
[Fatigue] : fatigue [Recent Wt Gain (___ Lbs)] : ~T recent [unfilled] lb weight gain [Cough] : cough [Seasonal Allergies] : seasonal allergies [GERD] : gerd [Headache] : headache [Obesity] : obesity [Negative] : Psychiatric [TextBox_30] : improving sx post-COVID [Postnasal Drip] : postnasal drip [TextBox_14] : sneezing

## 2024-04-24 NOTE — ASSESSMENT
[FreeTextEntry1] : ~age~yo man never smoker w/ asthma (dx in childhood), GERD w/ hiatal hernia, morbid obesity, MARGIE, referred by PMD and following for mgmt of asthma and MARGIE. Having allergic-type sx w/ dry cough, but not in asthma exac.   Data Reviewed: CBC (4/22/24) - no eosinophilia CPAP titration report ([facility], 1/3/24) - 6cm H2O with correction of AHI  Impression: #Asthma, last exacerbation mid-July 2023; stable #MARGIE #Post-nasal drip / allergic rhinitis  Plan: - cont albuterol MDI q4-6h PRN wheezing/dyspnea or chest tightness - has not used in lieu of PRN symbicort based on JESUS guidelines  - cont PRN Symbicort 160/4.5mcg 2 puffs symptom triggered - rx Flonase 1 spray BID both nostrils for at least 4-6wks - rx Azelastine 1% nasal spray BID both nostrils for at least 4-6wks - also suggested he reconsider trying OTC antihistamine in conjunction with nasal sprays for allergy sx - cont CPAP 6cm H2O via [facility] [place], he may benefit from them switching to APAP w/ ramp so he can fall asleep; discussed during visit - holding off Singulair d/t adverse effects - weight loss encouragement and reinforcement provided   RTO 3-6mo

## 2024-04-24 NOTE — DISCUSSION/SUMMARY
[FreeTextEntry1] : Asthma profile (8/2023) sig for cat allergy, mild allergy to ragweed, oak, dog, dust mite PFT (8/16/23) - normal FEV1 and ratio with significant BD response, normal volumes and DLCO

## 2024-04-24 NOTE — HISTORY OF PRESENT ILLNESS
[Never] : never [Obstructive Sleep Apnea] : obstructive sleep apnea [Awakes with Dry Mouth] : awakes with dry mouth [Awakes with Headache] : awakes with headache [Daytime Somnolence] : daytime somnolence [Difficulty Maintaining Sleep] : difficulty maintaining sleep [Frequent Nocturnal Awakening] : frequent nocturnal awakening [Recent  Weight Gain] : recent weight gain [Snoring] : snoring [Witnessed Apneas] : witnessed apneas [Lab] : lab [TextBox_77] : 1088 [TextBox_79] : 4983 [TextBox_81] : 55-84 [TextBox_83] : 1-2 [TextBox_85] : 7-8hr [TextBox_89] : 1-2 [TextBox_112] : unk [CPAP:] : CPAP [Nasal pillow] : nasal pillow [TextBox_4] : ~age~yo man never smoker w/ asthma (dx in childhood), GERD w/ hiatal hernia, obesity, referred by PMD to establish pulmonary care for asthma, with recent exacerbation about 2 weeks ago.   Began feeling ill 7/17 - coughing with yellow sputa, wheezing, chest tightness and DOMINGUEZ. No measured fevers or chills or other B-symptoms. ROS +congestion, rhinorrhea and PND. Went to  7/19, was given symbicort, mucinex and singulair but no steroids. Did not feel better. Albuterol inhaler only temporized asthma sx. Saw PMD 7/21 and given medrol dose gaurav with marked improvement in asthma symptoms. Today c/o bothersome cough that is still intermittently productive but now clear mucus if any. Cough is source of distress as it affects him throughout day and interferes with sleep. Been using symbicort PRN and albuterol PRN. No longer wheezing and overall improving. Last exacerbation prior to this was Dec 2022 when he got COVID-19 (fully vaccinated + boosters). Asthma was generally controlled with PRN JAQUAN most of his life but this year he says has been awful and attributes the poor air quality and pollution this season to his diminished overall health. Has not had a PFT in years. Has gained weight as he has not been exercising much this season and is anxious about seeing GI MD to address his GERD and hiatal hernia.   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 Doing better since last visit. Post-viral cough and body pain resolved, less airborne pollutants lately too so asthma feels more controlled. Recently returned from trip to [place]. Would like to get referral for re-initiation of CPAP for MARGIE. Hasn't had PSG in many years and his CPAP is not in good working order. Paid out of pocket for most of the supplies at the time. Feels symptomatic of MARGIE with daytime somnolence, loud snoring, AM headache, AM dry mouth, and poor sleep maintenance as primary sx.   [12/20/23] Here for 3 mo f/u. Had COVID a couple weeks ago. Felt terrible. Used Symbicort several times but more as a precaution. DId not need steroids, received paxlovid from urgent care when swab was positive. Main sx were significant fatigue, myalgias, cough, sputa; no wheezing. Doing better today, but concerned about long covid sx and feels foggy. Went for CPAP titration on Friday, was told his apneas were better on  PAP; awaiting results of titration and will be getting machine soon. Has not used or needed albuterol. Rinses after Symbicort.   [4/24/24] Here for f/u. Had ED/UC visit for flank pain, hematuria ? kidney stone. Has been coughing more recently with sneezing and general allergy sx. Historically unrelieved with OTC antihistamines. Not using any nasal sprays. Hasn't needed inhalers for asthma. Cough is mostly dry; a/w throat clearing and sniffing. Endorses PND. Has been trying to use CPAP but finds the pressure bothersome when trying to fall asleep. Frustrated about gaining weight still.  [TextBox_100] : 10/23 [TextBox_108] : 33.1 [TextBox_116] : 86 [TextBox_104] : 01/24 [TextBox_131] : 6cm H2O [TextBox_165] : via [facility] [TextBox_162] : 01/24 [TextBox_11] : 6 [ESS] : 9

## 2024-04-25 ENCOUNTER — APPOINTMENT (OUTPATIENT)
Dept: CT IMAGING | Facility: CLINIC | Age: 39
End: 2024-04-25

## 2024-04-25 ENCOUNTER — APPOINTMENT (OUTPATIENT)
Dept: UROLOGY | Facility: CLINIC | Age: 39
End: 2024-04-25
Payer: COMMERCIAL

## 2024-04-25 VITALS — WEIGHT: 220 LBS | RESPIRATION RATE: 15 BRPM | HEIGHT: 62 IN | BODY MASS INDEX: 40.48 KG/M2 | OXYGEN SATURATION: 98 %

## 2024-04-25 DIAGNOSIS — R35.0 FREQUENCY OF MICTURITION: ICD-10-CM

## 2024-04-25 PROCEDURE — 99203 OFFICE O/P NEW LOW 30 MIN: CPT

## 2024-05-07 PROBLEM — R35.0 URINARY FREQUENCY: Status: ACTIVE | Noted: 2024-04-22

## 2024-05-07 NOTE — ASSESSMENT
[FreeTextEntry1] : We discussed microscopic hematuria today and the multiple potential reasons for its presence. I discussed both benign and malignant etiologies that may explain hematuria. I've also reviewed the workup it is generally recommended for evaluation of microscopic hematuria with the purposes of ruling out malignancy or other urinary tract pathology that could warrant intervention. I've explained that cystoscopy would be recommended and the reasons for it as well as the risks of bleeding infection and damage to urethra A catscan withgout contrast was done and reviewe dno gu path   Pt will follow up tfor the cystoscopy

## 2024-05-07 NOTE — HISTORY OF PRESENT ILLNESS
[FreeTextEntry1] :    Chief Complaint: testicular pain.   - Chief Complaint: The patient is a 38y Male complaining of testicular pain. - HPI Objective Statement: 38-year-old male denies past medical history presents with bilateral testicular pain and episode of hematuria.  Patient states initially noticed hematuria and was evaluated at urgent care where they did an STI screen which was negative and a urinalysis which showed blood but no UTI.  Patient states since then has been getting a lot of pain/pressure in bilateral testicles as well as some lower back pain.  Patient states never had symptoms like this before.  Denies risk for having an STI.  Denies all other complaints at this time.  There is a family history of kidney stones so concerned she could possibly have a kidney stone.  ct no stones

## 2024-05-08 ENCOUNTER — APPOINTMENT (OUTPATIENT)
Dept: UROLOGY | Facility: CLINIC | Age: 39
End: 2024-05-08
Payer: COMMERCIAL

## 2024-05-08 VITALS
OXYGEN SATURATION: 98 % | DIASTOLIC BLOOD PRESSURE: 82 MMHG | HEIGHT: 62 IN | BODY MASS INDEX: 40.48 KG/M2 | HEART RATE: 93 BPM | WEIGHT: 220 LBS | TEMPERATURE: 98.3 F | SYSTOLIC BLOOD PRESSURE: 129 MMHG

## 2024-05-08 PROCEDURE — 52000 CYSTOURETHROSCOPY: CPT

## 2024-05-10 ENCOUNTER — APPOINTMENT (OUTPATIENT)
Dept: FAMILY MEDICINE | Facility: CLINIC | Age: 39
End: 2024-05-10

## 2024-05-23 ENCOUNTER — APPOINTMENT (OUTPATIENT)
Dept: FAMILY MEDICINE | Facility: CLINIC | Age: 39
End: 2024-05-23
Payer: COMMERCIAL

## 2024-05-23 VITALS
SYSTOLIC BLOOD PRESSURE: 122 MMHG | OXYGEN SATURATION: 95 % | HEIGHT: 62 IN | WEIGHT: 213 LBS | DIASTOLIC BLOOD PRESSURE: 79 MMHG | HEART RATE: 88 BPM | BODY MASS INDEX: 39.2 KG/M2 | TEMPERATURE: 98.2 F

## 2024-05-23 DIAGNOSIS — J45.21 MILD INTERMITTENT ASTHMA WITH (ACUTE) EXACERBATION: ICD-10-CM

## 2024-05-23 DIAGNOSIS — J06.9 ACUTE UPPER RESPIRATORY INFECTION, UNSPECIFIED: ICD-10-CM

## 2024-05-23 DIAGNOSIS — J30.2 OTHER SEASONAL ALLERGIC RHINITIS: ICD-10-CM

## 2024-05-23 DIAGNOSIS — Z88.9 ALLERGY STATUS TO UNSPECIFIED DRUGS, MEDICAMENTS AND BIOLOGICAL SUBSTANCES: ICD-10-CM

## 2024-05-23 PROCEDURE — 99214 OFFICE O/P EST MOD 30 MIN: CPT

## 2024-05-23 NOTE — REVIEW OF SYSTEMS
[Postnasal Drip] : postnasal drip [Nasal Discharge] : nasal discharge [Sore Throat] : sore throat [Cough] : cough [Shortness Of Breath] : no shortness of breath [Wheezing] : no wheezing

## 2024-05-23 NOTE — PHYSICAL EXAM
[Normal Outer Ear/Nose] : the outer ears and nose were normal in appearance [Normal Oropharynx] : the oropharynx was normal [Normal TMs] : both tympanic membranes were normal [No Respiratory Distress] : no respiratory distress  [de-identified] : Turbinate swelling bilaterally, no sinus tendness

## 2024-05-23 NOTE — HISTORY OF PRESENT ILLNESS
[FreeTextEntry8] : Pt complained 4 days h/o stuffy and runny nose, ear clogging, sore throat, and cough, no fever, vomiting and diarrhea. Coughing was very hard, pt saw blood on the surface of the mucous. No sob and wheezing. Pt only used symbicort 1 puff daily, instead of 2 puffs twice daily as pulmonologist advised.

## 2024-06-13 PROBLEM — K21.9 GERD (GASTROESOPHAGEAL REFLUX DISEASE): Status: ACTIVE | Noted: 2022-11-19

## 2024-06-13 PROBLEM — R31.9 HEMATURIA, UNSPECIFIED TYPE: Status: ACTIVE | Noted: 2024-04-22

## 2024-06-13 PROBLEM — J30.9 ALLERGIC RHINITIS: Status: ACTIVE | Noted: 2024-05-23

## 2024-06-13 PROBLEM — E78.1 HYPERTRIGLYCERIDEMIA: Status: ACTIVE | Noted: 2022-11-19

## 2024-06-13 PROBLEM — E53.8 VITAMIN B12 DEFICIENCY: Status: ACTIVE | Noted: 2022-11-19

## 2024-06-13 PROBLEM — Z00.01 ENCOUNTER FOR WELL ADULT EXAM WITH ABNORMAL FINDINGS: Status: ACTIVE | Noted: 2024-04-24

## 2024-06-13 PROBLEM — E66.9 OBESITY: Status: ACTIVE | Noted: 2021-02-16

## 2024-06-13 PROBLEM — E55.9 VITAMIN D INSUFFICIENCY: Status: ACTIVE | Noted: 2022-11-19

## 2024-06-13 PROBLEM — G40.909 SEIZURE DISORDER: Status: ACTIVE | Noted: 2018-05-31

## 2024-06-13 PROBLEM — G47.33 OSA (OBSTRUCTIVE SLEEP APNEA): Status: ACTIVE | Noted: 2023-09-06

## 2024-06-13 PROBLEM — J45.909 ASTHMA: Status: ACTIVE | Noted: 2018-10-17

## 2024-06-13 PROBLEM — F41.8 ANXIETY WITH DEPRESSION: Status: ACTIVE | Noted: 2018-08-27

## 2024-06-13 NOTE — HISTORY OF PRESENT ILLNESS
[de-identified] : 38 years old male has past medical history of asthma, anxiety and depression, GERD, hematuria, hypertriglyceridemia, MARGIE, seizure disorder, vitamin B 12 and D deficiency, seasonal allergy, and morbid obesity, came to office for annual physical exam.

## 2024-06-19 ENCOUNTER — APPOINTMENT (OUTPATIENT)
Dept: FAMILY MEDICINE | Facility: CLINIC | Age: 39
End: 2024-06-19

## 2024-06-19 DIAGNOSIS — J30.9 ALLERGIC RHINITIS, UNSPECIFIED: ICD-10-CM

## 2024-06-19 DIAGNOSIS — E66.9 OBESITY, UNSPECIFIED: ICD-10-CM

## 2024-06-19 DIAGNOSIS — F41.8 OTHER SPECIFIED ANXIETY DISORDERS: ICD-10-CM

## 2024-06-19 DIAGNOSIS — R31.9 HEMATURIA, UNSPECIFIED: ICD-10-CM

## 2024-06-19 DIAGNOSIS — E53.8 DEFICIENCY OF OTHER SPECIFIED B GROUP VITAMINS: ICD-10-CM

## 2024-06-19 DIAGNOSIS — K21.9 GASTRO-ESOPHAGEAL REFLUX DISEASE W/OUT ESOPHAGITIS: ICD-10-CM

## 2024-06-19 DIAGNOSIS — G47.33 OBSTRUCTIVE SLEEP APNEA (ADULT) (PEDIATRIC): ICD-10-CM

## 2024-06-19 DIAGNOSIS — J45.909 UNSPECIFIED ASTHMA, UNCOMPLICATED: ICD-10-CM

## 2024-06-19 DIAGNOSIS — E55.9 VITAMIN D DEFICIENCY, UNSPECIFIED: ICD-10-CM

## 2024-06-19 DIAGNOSIS — G40.909 EPILEPSY, UNSPECIFIED, NOT INTRACTABLE, W/OUT STATUS EPILEPTICUS: ICD-10-CM

## 2024-06-19 DIAGNOSIS — E78.1 PURE HYPERGLYCERIDEMIA: ICD-10-CM

## 2024-06-19 DIAGNOSIS — Z00.01 ENCOUNTER FOR GENERAL ADULT MEDICAL EXAMINATION WITH ABNORMAL FINDINGS: ICD-10-CM

## 2024-06-21 ENCOUNTER — TRANSCRIPTION ENCOUNTER (OUTPATIENT)
Age: 39
End: 2024-06-21

## 2024-09-03 ENCOUNTER — NON-APPOINTMENT (OUTPATIENT)
Age: 39
End: 2024-09-03

## 2024-09-04 ENCOUNTER — APPOINTMENT (OUTPATIENT)
Dept: PULMONOLOGY | Facility: CLINIC | Age: 39
End: 2024-09-04

## 2024-09-05 ENCOUNTER — NON-APPOINTMENT (OUTPATIENT)
Age: 39
End: 2024-09-05

## 2024-09-13 ENCOUNTER — EMERGENCY (EMERGENCY)
Facility: HOSPITAL | Age: 39
LOS: 1 days | Discharge: HOME | End: 2024-09-13
Attending: STUDENT IN AN ORGANIZED HEALTH CARE EDUCATION/TRAINING PROGRAM
Payer: COMMERCIAL

## 2024-09-13 VITALS
WEIGHT: 212.08 LBS | OXYGEN SATURATION: 94 % | HEART RATE: 107 BPM | RESPIRATION RATE: 18 BRPM | DIASTOLIC BLOOD PRESSURE: 75 MMHG | HEIGHT: 62 IN | SYSTOLIC BLOOD PRESSURE: 109 MMHG | TEMPERATURE: 99 F

## 2024-09-13 PROCEDURE — 99283 EMERGENCY DEPT VISIT LOW MDM: CPT

## 2024-09-13 NOTE — ED ADULT TRIAGE NOTE - CHIEF COMPLAINT QUOTE
c/o rashes on the body and face after tried on new clothes   Took Benadryl  25 mg at 1900   C/o numbness to lips now

## 2024-09-14 ENCOUNTER — NON-APPOINTMENT (OUTPATIENT)
Age: 39
End: 2024-09-14

## 2024-09-14 PROCEDURE — 99283 EMERGENCY DEPT VISIT LOW MDM: CPT | Mod: 25

## 2024-09-14 PROCEDURE — 94640 AIRWAY INHALATION TREATMENT: CPT

## 2024-09-14 RX ORDER — PREDNISONE 10 MG
50 TABLET, DOSE PACK ORAL ONCE
Refills: 0 | Status: COMPLETED | OUTPATIENT
Start: 2024-09-14 | End: 2024-09-14

## 2024-09-14 RX ORDER — FAMOTIDINE 10 MG/ML
20 INJECTION INTRAVENOUS DAILY
Refills: 0 | Status: DISCONTINUED | OUTPATIENT
Start: 2024-09-14 | End: 2024-09-17

## 2024-09-14 RX ORDER — PREDNISONE 10 MG
1 TABLET, DOSE PACK ORAL
Qty: 4 | Refills: 0
Start: 2024-09-14 | End: 2024-09-17

## 2024-09-14 RX ORDER — FAMOTIDINE 10 MG/ML
1 INJECTION INTRAVENOUS
Qty: 8 | Refills: 0
Start: 2024-09-14 | End: 2024-09-17

## 2024-09-14 RX ADMIN — Medication 1 PUFF(S): at 02:21

## 2024-09-14 RX ADMIN — FAMOTIDINE 20 MILLIGRAM(S): 10 INJECTION INTRAVENOUS at 02:21

## 2024-09-14 RX ADMIN — Medication 50 MILLIGRAM(S): at 02:21

## 2024-09-14 NOTE — ED PROVIDER NOTE - CLINICAL SUMMARY MEDICAL DECISION MAKING FREE TEXT BOX
39-year-old male hx of asthma, presenting with rash earlier today now resolved. Likely allergic, possibly to patient's new clothing. Will d/c with Prednisone/Pepcid, continue Benadryl PRN. Allergy followup information provided.

## 2024-09-14 NOTE — ED PROVIDER NOTE - PATIENT PORTAL LINK FT
You can access the FollowMyHealth Patient Portal offered by [facility] by registering at the following website: [email or web address]. By joining [employer]’s FollowMyHealth portal, you will also be able to view your health information using other applications (apps) compatible with our system.

## 2024-09-14 NOTE — ED PROVIDER NOTE - NSFOLLOWUPINSTRUCTIONS_ED_ALL_ED_FT
You were seen in the emergency department for: rash  Your diagnosis for this visit was: possible allergy  From this ED visit you were prescribed: Prednisone, Pepcid  Your results report is attached.   We recommend you follow up with: Allergy & Immunology    Please return to the Emergency Department if you experience any of the following symptoms:   - Shortness of breath or trouble breathing  - Pressure, pain or tightness in the chest  - Face drooping, arm weakness or speech difficulty  - Persistence of severe vomiting  - Head injury or loss of consciousness  - Nonstop bleeding or an open wound    (1) Follow up with your primary care physician within the next 24-48 hours as discussed. In addition, we did not find evidence of a life threatening illness on your testing here today, but listed below are the specialists that will be necessary to see as an outpatient to continue the workup.  Please call the numbers listed below or [phone] to set up the necessary appointments.  (2) Take Tylenol (up to 1000mg or 1 g)  and/or Motrin (up to 600mg) up to every 6 hours as needed for pain.   (3) If you had an IV (intravenous) line placed, it was removed. Sometimes, after IV removal, that area can be tender for a few days; if it develops redness and swelling, those could be signs of infection; in which case, return to the Emergency Department for assessment.  (4) Please continue taking all of your home medications as directed.

## 2024-09-14 NOTE — ED PROVIDER NOTE - NSFOLLOWUPCLINICS_GEN_ALL_ED_FT
[facility] Allergy and Immunology  Allergy  [address] [place] [address]  Phone: [phone]  Fax:

## 2024-09-14 NOTE — ED PROVIDER NOTE - OBJECTIVE STATEMENT
39-year-old male hx of asthma, presenting with rash earlier today now resolved. Believes the rash is a result of his clothing.  Took some Benadryl. Felt he had some lip swelling and tingling so came here. No SOB, wheezing, throat swelling, or any other concerning symptoms. 39-year-old male hx of asthma, presenting with rash earlier today now resolved. Believes the rash is a result of new clothing that he wore today.  Took some Benadryl. Felt he had some lip swelling and tingling so came here. No SOB, wheezing, throat swelling, or any other concerning symptoms.

## 2024-09-14 NOTE — ED ADULT NURSE NOTE - OBJECTIVE STATEMENT
Patient presented to the ED c/o rash as per patient possibly from wearing same clothes without washing them, numbness to lips

## 2024-09-14 NOTE — ED ADULT NURSE NOTE - TEMPLATE
Rash MEDICATIONS  (STANDING):  folic acid 1 milliGRAM(s) Oral daily  influenza   Vaccine 0.5 milliLiter(s) IntraMuscular once  multivitamin 1 Tablet(s) Oral daily  sodium chloride 0.9%. 1000 milliLiter(s) (80 mL/Hr) IV Continuous <Continuous>  thiamine 100 milliGRAM(s) Oral daily    MEDICATIONS  (PRN):  aluminum hydroxide/magnesium hydroxide/simethicone Suspension 30 milliLiter(s) Oral every 4 hours PRN Dyspepsia  LORazepam   Injectable 2 milliGRAM(s) IV Push every 1 hour PRN Symptom-triggered: each CIWA -Ar score 8 or GREATER  melatonin 3 milliGRAM(s) Oral at bedtime PRN Insomnia  ondansetron Injectable 4 milliGRAM(s) IV Push every 8 hours PRN Nausea and/or Vomiting

## 2024-11-01 ENCOUNTER — APPOINTMENT (OUTPATIENT)
Dept: PULMONOLOGY | Facility: CLINIC | Age: 39
End: 2024-11-01

## 2025-03-17 ENCOUNTER — NON-APPOINTMENT (OUTPATIENT)
Age: 40
End: 2025-03-17

## 2025-03-18 ENCOUNTER — APPOINTMENT (OUTPATIENT)
Dept: ORTHOPEDIC SURGERY | Facility: CLINIC | Age: 40
End: 2025-03-18

## 2025-03-18 DIAGNOSIS — S92.902A UNSPECIFIED FRACTURE OF LEFT FOOT, INITIAL ENCOUNTER FOR CLOSED FRACTURE: ICD-10-CM

## 2025-03-26 ENCOUNTER — NON-APPOINTMENT (OUTPATIENT)
Age: 40
End: 2025-03-26

## 2025-05-06 ENCOUNTER — NON-APPOINTMENT (OUTPATIENT)
Age: 40
End: 2025-05-06

## 2025-05-08 ENCOUNTER — APPOINTMENT (OUTPATIENT)
Dept: FAMILY MEDICINE | Facility: CLINIC | Age: 40
End: 2025-05-08

## 2025-05-08 VITALS
HEIGHT: 62 IN | BODY MASS INDEX: 40.3 KG/M2 | TEMPERATURE: 98.2 F | OXYGEN SATURATION: 94 % | HEART RATE: 130 BPM | WEIGHT: 219 LBS | SYSTOLIC BLOOD PRESSURE: 105 MMHG | DIASTOLIC BLOOD PRESSURE: 73 MMHG

## 2025-05-08 DIAGNOSIS — J06.9 ACUTE UPPER RESPIRATORY INFECTION, UNSPECIFIED: ICD-10-CM

## 2025-05-08 PROCEDURE — 99213 OFFICE O/P EST LOW 20 MIN: CPT

## 2025-05-08 PROCEDURE — 87804 INFLUENZA ASSAY W/OPTIC: CPT | Mod: QW

## 2025-05-09 LAB
FLUAV SPEC QL CULT: NEGATIVE
FLUBV AG SPEC QL IA: NEGATIVE

## 2025-05-11 PROBLEM — J45.901 MODERATE ASTHMA WITH EXACERBATION, UNSPECIFIED WHETHER PERSISTENT: Status: ACTIVE | Noted: 2025-05-11

## 2025-05-12 ENCOUNTER — NON-APPOINTMENT (OUTPATIENT)
Age: 40
End: 2025-05-12

## 2025-05-16 ENCOUNTER — RX RENEWAL (OUTPATIENT)
Age: 40
End: 2025-05-16

## 2025-05-16 RX ORDER — BUDESONIDE AND FORMOTEROL FUMARATE 160; 4.5 UG/1; UG/1
160-4.5 AEROSOL, METERED RESPIRATORY (INHALATION)
Qty: 30.9 | Refills: 0 | Status: ACTIVE | COMMUNITY
Start: 2025-05-16 | End: 1900-01-01

## 2025-05-21 ENCOUNTER — NON-APPOINTMENT (OUTPATIENT)
Age: 40
End: 2025-05-21

## 2025-07-14 ENCOUNTER — TRANSCRIPTION ENCOUNTER (OUTPATIENT)
Age: 40
End: 2025-07-14

## 2025-07-16 ENCOUNTER — TRANSCRIPTION ENCOUNTER (OUTPATIENT)
Age: 40
End: 2025-07-16

## 2025-09-12 ENCOUNTER — APPOINTMENT (OUTPATIENT)
Dept: FAMILY MEDICINE | Facility: CLINIC | Age: 40
End: 2025-09-12

## 2025-09-12 DIAGNOSIS — E53.8 DEFICIENCY OF OTHER SPECIFIED B GROUP VITAMINS: ICD-10-CM

## 2025-09-12 DIAGNOSIS — J45.909 UNSPECIFIED ASTHMA, UNCOMPLICATED: ICD-10-CM

## 2025-09-12 DIAGNOSIS — F41.8 OTHER SPECIFIED ANXIETY DISORDERS: ICD-10-CM

## 2025-09-12 DIAGNOSIS — Z00.01 ENCOUNTER FOR GENERAL ADULT MEDICAL EXAMINATION WITH ABNORMAL FINDINGS: ICD-10-CM

## 2025-09-12 DIAGNOSIS — K21.9 GASTRO-ESOPHAGEAL REFLUX DISEASE W/OUT ESOPHAGITIS: ICD-10-CM

## 2025-09-12 DIAGNOSIS — G40.909 EPILEPSY, UNSPECIFIED, NOT INTRACTABLE, W/OUT STATUS EPILEPTICUS: ICD-10-CM

## 2025-09-12 DIAGNOSIS — E78.1 PURE HYPERGLYCERIDEMIA: ICD-10-CM

## 2025-09-12 DIAGNOSIS — G47.33 OBSTRUCTIVE SLEEP APNEA (ADULT) (PEDIATRIC): ICD-10-CM

## 2025-09-12 DIAGNOSIS — E66.9 OBESITY, UNSPECIFIED: ICD-10-CM

## 2025-09-12 DIAGNOSIS — E55.9 VITAMIN D DEFICIENCY, UNSPECIFIED: ICD-10-CM

## 2025-09-12 DIAGNOSIS — J30.9 ALLERGIC RHINITIS, UNSPECIFIED: ICD-10-CM

## 2025-09-18 ENCOUNTER — NON-APPOINTMENT (OUTPATIENT)
Age: 40
End: 2025-09-18